# Patient Record
Sex: FEMALE | Race: WHITE | NOT HISPANIC OR LATINO | URBAN - METROPOLITAN AREA
[De-identification: names, ages, dates, MRNs, and addresses within clinical notes are randomized per-mention and may not be internally consistent; named-entity substitution may affect disease eponyms.]

---

## 2018-10-15 ENCOUNTER — TELEPHONE (OUTPATIENT)
Dept: OBSTETRICS AND GYNECOLOGY | Facility: CLINIC | Age: 43
End: 2018-10-15

## 2018-10-15 RX ORDER — FLUCONAZOLE 100 MG/1
100 TABLET ORAL DAILY
Qty: 3 TABLET | Refills: 0 | Status: SHIPPED | OUTPATIENT
Start: 2018-10-15 | End: 2019-11-27 | Stop reason: ALTCHOICE

## 2018-12-20 ENCOUNTER — TELEPHONE (OUTPATIENT)
Dept: OBSTETRICS AND GYNECOLOGY | Facility: CLINIC | Age: 43
End: 2018-12-20

## 2018-12-20 RX ORDER — FLUCONAZOLE 100 MG/1
100 TABLET ORAL DAILY
Qty: 3 TABLET | Refills: 0 | Status: SHIPPED | OUTPATIENT
Start: 2018-12-20 | End: 2019-11-27 | Stop reason: ALTCHOICE

## 2018-12-20 RX ORDER — CLOTRIMAZOLE AND BETAMETHASONE DIPROPIONATE 10; .64 MG/G; MG/G
CREAM TOPICAL 2 TIMES DAILY
Qty: 45 G | Refills: 1 | Status: SHIPPED | OUTPATIENT
Start: 2018-12-20 | End: 2019-11-27 | Stop reason: ALTCHOICE

## 2018-12-20 NOTE — TELEPHONE ENCOUNTER
Patient called and has a terrible yeast infection and needs a prescription called in asap to [facility] at [phone].  Please call.  Thank you!

## 2019-01-15 ENCOUNTER — TELEPHONE (OUTPATIENT)
Dept: OBSTETRICS AND GYNECOLOGY | Facility: CLINIC | Age: 44
End: 2019-01-15
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
## 2019-02-07 ENCOUNTER — TELEPHONE (OUTPATIENT)
Dept: OBSTETRICS AND GYNECOLOGY | Facility: CLINIC | Age: 44
End: 2019-02-07

## 2019-03-18 ENCOUNTER — TELEPHONE (OUTPATIENT)
Dept: OBSTETRICS AND GYNECOLOGY | Facility: CLINIC | Age: 44
End: 2019-03-18

## 2019-03-18 DIAGNOSIS — Z3A.01 LESS THAN 8 WEEKS GESTATION OF PREGNANCY: Primary | ICD-10-CM

## 2019-03-18 NOTE — TELEPHONE ENCOUNTER
2nd call, patient wants to know if you can put the order through for her blood work (progestrone and hcg) to [facility].  Please call.  Thank you

## 2019-03-18 NOTE — TELEPHONE ENCOUNTER
Patient called, she would like  to order lab test for Pregnancy HCG and Progesterone due to her having miscarriages in the past. She also want a prescription for Progesterone order at pharmacy. Please notify patient

## 2019-03-22 ENCOUNTER — TELEPHONE (OUTPATIENT)
Dept: OBSTETRICS AND GYNECOLOGY | Facility: CLINIC | Age: 44
End: 2019-03-22

## 2019-03-22 LAB
B-HCG SERPL QL: POSITIVE MIU/ML
HCG INTACT+B SERPL-ACNC: 589 MIU/ML
PROGEST SERPL-MCNC: 12.3 NG/ML
SPECIMEN STATUS: NORMAL

## 2019-03-23 NOTE — TELEPHONE ENCOUNTER
On 3 18 Monday  Beta 405   Repeated 3 21 Thursday 589. Poor rise will check Monday stat  Ectopic precautions given

## 2019-03-25 ENCOUNTER — APPOINTMENT (OUTPATIENT)
Dept: LAB | Facility: HOSPITAL | Age: 44
End: 2019-03-25
Attending: OBSTETRICS & GYNECOLOGY
Payer: COMMERCIAL

## 2019-03-25 DIAGNOSIS — N97.9 FEMALE FERTILITY PROBLEM: Primary | ICD-10-CM

## 2019-03-25 DIAGNOSIS — E34.9 HORMONE IMBALANCE: ICD-10-CM

## 2019-03-25 DIAGNOSIS — N97.9 FEMALE FERTILITY PROBLEM: ICD-10-CM

## 2019-03-25 LAB — HCG SERPL-ACNC: 2068 MIU/L

## 2019-03-25 PROCEDURE — 84702 CHORIONIC GONADOTROPIN TEST: CPT

## 2019-03-25 PROCEDURE — 36415 COLL VENOUS BLD VENIPUNCTURE: CPT

## 2019-03-25 NOTE — TELEPHONE ENCOUNTER
Patient called and said that she had texted Dr. Beauchamp about a set up for [facility] appt(?)  She wasn't sure what that meant.  But, she would like blood work ordered (hcg quantative test) and sent to [facility]. Please call.  Thank you

## 2019-03-26 NOTE — TELEPHONE ENCOUNTER
"hCG,Beta Subunit,Qnt,Serum mIU/mL 589  <6 mIU/mL\" class=\"[id number]\">  PositiveR         "

## 2019-04-02 ENCOUNTER — APPOINTMENT (OUTPATIENT)
Dept: LAB | Facility: HOSPITAL | Age: 44
End: 2019-04-02
Attending: OBSTETRICS & GYNECOLOGY
Payer: COMMERCIAL

## 2019-04-02 ENCOUNTER — TELEPHONE (OUTPATIENT)
Dept: OBSTETRICS AND GYNECOLOGY | Facility: CLINIC | Age: 44
End: 2019-04-02

## 2019-04-02 ENCOUNTER — OFFICE VISIT (OUTPATIENT)
Dept: OBSTETRICS AND GYNECOLOGY | Facility: CLINIC | Age: 44
End: 2019-04-02
Payer: COMMERCIAL

## 2019-04-02 VITALS
DIASTOLIC BLOOD PRESSURE: 62 MMHG | SYSTOLIC BLOOD PRESSURE: 88 MMHG | HEIGHT: 70 IN | BODY MASS INDEX: 22.48 KG/M2 | WEIGHT: 157 LBS

## 2019-04-02 DIAGNOSIS — N91.2 AMENORRHEA: Primary | ICD-10-CM

## 2019-04-02 DIAGNOSIS — Z3A.01 LESS THAN 8 WEEKS GESTATION OF PREGNANCY: ICD-10-CM

## 2019-04-02 LAB — HCG SERPL-ACNC: ABNORMAL MIU/L

## 2019-04-02 PROCEDURE — 76830 TRANSVAGINAL US NON-OB: CPT | Performed by: OBSTETRICS & GYNECOLOGY

## 2019-04-02 PROCEDURE — 36415 COLL VENOUS BLD VENIPUNCTURE: CPT

## 2019-04-02 PROCEDURE — 84702 CHORIONIC GONADOTROPIN TEST: CPT

## 2019-04-02 PROCEDURE — 99214 OFFICE O/P EST MOD 30 MIN: CPT | Mod: 25 | Performed by: OBSTETRICS & GYNECOLOGY

## 2019-04-02 RX ORDER — PROGESTERONE 200 MG/1
CAPSULE ORAL
Refills: 0 | COMMUNITY
Start: 2019-03-25 | End: 2019-07-09 | Stop reason: HOSPADM

## 2019-04-02 NOTE — PROGRESS NOTES
"  Subjective     Patient ID: Frances Oconnor is a 44 y.o. female.    HPI               OB visit     Review of Systems    Objective     Vitals:    04/02/19 1531   BP: (!) 88/62   Weight: 71.2 kg (157 lb)   Height: 1.778 m (5' 10\")     Body mass index is 22.53 kg/m².    Physical Exam     Early pregnancy poor dates still breast feeding no menses   On 3 18 Monday  Beta 405   Repeated 3 21 Thursday 589. Poor rise will check Monday stat  Ectopic precautions given   3/25 beta was 2,068     Ultrasound     yok sac 4 cm, small gestational sac 1.3 cm , no CRL yet no fetal pole, by first conception by first positive pregnancy would expect 6 today 5/6       Assessment/Plan      Do [facility]       Diagnoses and all orders for this visit:    Amenorrhea (Primary)    Less than 8 weeks gestation of pregnancy  -     BhCG, Serum, Quant; Future        Jeannette CAIN MA, am scribing for, and in the presence of, Pascual Beauchamp MD.    4/2/2019 4:11 PM     IPascual MD, personally performed the services described in this documentation as scribed by alexandr  in my presence, and it is both accurate and complete.    4/2/2019 4:11 PM  "

## 2019-04-02 NOTE — TELEPHONE ENCOUNTER
Frances had a stat beta today 13,662 this is up from 2000 on 325 although is going up not an appropriate rise still with some concern regarding the pregnancy to rescan next week

## 2019-04-11 ENCOUNTER — OFFICE VISIT (OUTPATIENT)
Dept: OBSTETRICS AND GYNECOLOGY | Facility: CLINIC | Age: 44
End: 2019-04-11
Payer: COMMERCIAL

## 2019-04-11 VITALS
DIASTOLIC BLOOD PRESSURE: 68 MMHG | SYSTOLIC BLOOD PRESSURE: 98 MMHG | BODY MASS INDEX: 23.05 KG/M2 | WEIGHT: 161 LBS | HEIGHT: 70 IN

## 2019-04-11 DIAGNOSIS — Z34.01 ENCOUNTER FOR SUPERVISION OF NORMAL FIRST PREGNANCY IN FIRST TRIMESTER: Primary | ICD-10-CM

## 2019-04-11 DIAGNOSIS — Z3A.01 LESS THAN 8 WEEKS GESTATION OF PREGNANCY: ICD-10-CM

## 2019-04-11 DIAGNOSIS — R93.89 ABNORMAL ULTRASOUND: ICD-10-CM

## 2019-04-11 DIAGNOSIS — Q99.2 FRAGILE X CHROMOSOMAL ANOMALY: ICD-10-CM

## 2019-04-11 LAB
BLOOD URINE, POC: NEGATIVE
GLUCOSE URINE, POC: NEGATIVE
NITRITE, POC: NEGATIVE
PROTEIN, POC: NEGATIVE

## 2019-04-11 PROCEDURE — 81002 URINALYSIS NONAUTO W/O SCOPE: CPT | Performed by: OBSTETRICS & GYNECOLOGY

## 2019-04-11 PROCEDURE — 0502F SUBSEQUENT PRENATAL CARE: CPT | Performed by: OBSTETRICS & GYNECOLOGY

## 2019-04-11 PROCEDURE — 76815 OB US LIMITED FETUS(S): CPT | Performed by: OBSTETRICS & GYNECOLOGY

## 2019-04-11 NOTE — PROGRESS NOTES
F/u fr early pregnancy, had exam a week ago had gestational sac no fetal pole concern about dating an low rising BETA, on Prometrium   Genetics border fragile X    Ultrasound Dr Beauchamp  Abdominal indication 1st trimester      CRL 1cm, 7 wks 1 days , , normal gestational sac,         Assessment Plan     Check in 2 weeks prenatal labs

## 2019-04-17 LAB
ERYTHROCYTE [DISTWIDTH] IN BLOOD BY AUTOMATED COUNT: 13.5 % (ref 12.3–15.4)
HCT VFR BLD AUTO: 39.6 % (ref 34–46.6)
HGB BLD-MCNC: 13 G/DL (ref 11.1–15.9)
MCH RBC QN AUTO: 28.8 PG (ref 26.6–33)
MCHC RBC AUTO-ENTMCNC: 32.8 G/DL (ref 31.5–35.7)
MCV RBC AUTO: 88 FL (ref 79–97)
PLATELET # BLD AUTO: 176 X10E3/UL (ref 150–379)
RBC # BLD AUTO: 4.52 X10E6/UL (ref 3.77–5.28)
WBC # BLD AUTO: 6.5 X10E3/UL (ref 3.4–10.8)

## 2019-04-18 LAB
25(OH)D3+25(OH)D2 SERPL-MCNC: 27.9 NG/ML (ref 30–100)
ABO GROUP BLD: NORMAL
APPEARANCE UR: CLEAR
BACTERIA #/AREA URNS HPF: NORMAL /[HPF]
BACTERIA UR CULT: NO GROWTH
BACTERIA UR CULT: NORMAL
BILIRUB UR QL STRIP: NEGATIVE
BLD GP AB SCN SERPL QL: NEGATIVE
COLOR UR: YELLOW
EPI CELLS #/AREA URNS HPF: NORMAL /HPF
GLUCOSE UR QL: NEGATIVE
HCG INTACT+B SERPL-ACNC: NORMAL MIU/ML
HCV AB S/CO SERPL IA: <0.1 S/CO RATIO (ref 0–0.9)
HGB UR QL STRIP: NEGATIVE
HIV 1+2 AB+HIV1 P24 AG SERPL QL IA: NON REACTIVE
KETONES UR QL STRIP: NEGATIVE
LAB CORP COMMENT:: NORMAL
LEUKOCYTE ESTERASE UR QL STRIP: NEGATIVE
MICRO URNS: (no result)
MICRO URNS: (no result)
MUCOUS THREADS URNS QL MICRO: PRESENT
NITRITE UR QL STRIP: NEGATIVE
PH UR STRIP: 6.5 [PH] (ref 5–7.5)
PROGEST SERPL-MCNC: 17 NG/ML
PROT UR QL STRIP: NEGATIVE
RBC #/AREA URNS HPF: NORMAL /HPF
RH BLD: NEGATIVE
RPR SER QL: NON REACTIVE
RUBV IGG SERPL IA-ACNC: 1.63 INDEX
SP GR UR: 1.02 (ref 1–1.03)
T GONDII IGM SER IA-ACNC: <3 AU/ML (ref 0–7.9)
T4 FREE SERPL-MCNC: 0.66 NG/DL (ref 0.82–1.77)
TSH SERPL DL<=0.005 MIU/L-ACNC: 1.48 UIU/ML (ref 0.45–4.5)
UROBILINOGEN UR STRIP-MCNC: 0.2 EU/DL (ref 0.2–1)
VZV IGG SER IA-ACNC: <135 INDEX
WBC #/AREA URNS HPF: NORMAL /HPF

## 2019-04-20 LAB
B19V IGG SER IA-ACNC: 0.2 INDEX (ref 0–0.8)
B19V IGM SER IA-ACNC: 0.1 INDEX (ref 0–0.8)

## 2019-04-23 ENCOUNTER — OFFICE VISIT (OUTPATIENT)
Dept: OBSTETRICS AND GYNECOLOGY | Facility: CLINIC | Age: 44
End: 2019-04-23
Payer: COMMERCIAL

## 2019-04-23 VITALS
SYSTOLIC BLOOD PRESSURE: 108 MMHG | HEIGHT: 70 IN | WEIGHT: 163 LBS | DIASTOLIC BLOOD PRESSURE: 60 MMHG | BODY MASS INDEX: 23.34 KG/M2

## 2019-04-23 DIAGNOSIS — Z3A.16 16 WEEKS GESTATION OF PREGNANCY: ICD-10-CM

## 2019-04-23 DIAGNOSIS — Z34.01 ENCOUNTER FOR SUPERVISION OF NORMAL FIRST PREGNANCY IN FIRST TRIMESTER: Primary | ICD-10-CM

## 2019-04-23 DIAGNOSIS — Z3A.10 10 WEEKS GESTATION OF PREGNANCY: ICD-10-CM

## 2019-04-23 PROCEDURE — 81002 URINALYSIS NONAUTO W/O SCOPE: CPT | Performed by: OBSTETRICS & GYNECOLOGY

## 2019-04-23 PROCEDURE — 0502F SUBSEQUENT PRENATAL CARE: CPT | Performed by: OBSTETRICS & GYNECOLOGY

## 2019-04-23 PROCEDURE — 76815 OB US LIMITED FETUS(S): CPT | Performed by: OBSTETRICS & GYNECOLOGY

## 2019-04-23 NOTE — PRENATAL NOTE
"  Ul              F/u fr early pregnancy, had exam a week ago had gestational sac no fetal pole concern about dating an low rising BETA, on Prometrium   Genetics border fragile X     Ultrasound Dr Beauchamp  Abdominal indication 1st trimester       CRL 1cm, 7 wks 1 days , , normal gestational sac,             Patient ID: Frances Oconnor is a 44 y.o. female.     HPI               OB visit      Review of Systems        Objective         Vitals       Vitals:     04/02/19 1531   BP: (!) 88/62   Weight: 71.2 kg (157 lb)   Height: 1.778 m (5' 10\")         Body mass index is 22.53 kg/m².     Physical Exam      Early pregnancy poor dates still breast feeding no menses   On 3 18 Monday  Beta 405   Repeated 3 21 Thursday 589. Poor rise will check Monday stat  Ectopic precautions given   3/25 beta was 2,068      Ultrasound      yok sac 4 cm, small gestational sac 1.3 cm , no CRL yet no fetal pole, by first conception by first positive pregnancy would expect 6 today 5/6            Assessment/Plan          Do [facility]         Diagnoses and all orders for this visit:     Amenorrhea (Primary)     Less than 8 weeks gestation of pregnancy  -     BhCG, Serum, Quant; Future       "

## 2019-04-26 LAB
FMR1 GENE CGG RPT BLD/T QL: NORMAL
FMR1 GENE MUT ANL BLD/T: NORMAL
LAB CORP COMMENT:: NORMAL

## 2019-05-09 LAB
# FETUSES US: 1
CHR X + Y ANEUP PLAS.CFDNA: NORMAL
CYTOGENETICS STUDY: NORMAL
GA: 10.6 WEEKS
GENETIC ALGORITHM SENSITIVITY: NORMAL %
LAB CORP CHROMOSOME 13 INTERPRETATION: NORMAL
LAB CORP CHROMOSOME 13 RESULT: NORMAL
LAB CORP CHROMOSOME 18 INTERPRETATION: NORMAL
LAB CORP CHROMOSOME 18 RESULT: NORMAL
LAB CORP CHROMOSOME 21 INTERPRETATION: NORMAL
LAB CORP CHROMOSOME 21 RESULT: NORMAL
LAB CORP DISCLAIMER: NORMAL
LAB CORP FETAL FRACTION (%):: 7.4
LAB CORP INDICATION FOR TESTING: NORMAL
LAB CORP REFERENCES: NORMAL
LAB CORP SCREEN RESULT: NORMAL
LAB CORP SEX CHROMOSOME INTERPRETATION: NORMAL
LAB DIRECTOR NAME PROVIDER: NORMAL
REF LAB TEST METHOD: NORMAL
SERVICE CMNT 02-IMP: NORMAL
SERVICE CMNT-IMP: NORMAL

## 2019-05-15 ENCOUNTER — OFFICE VISIT (OUTPATIENT)
Dept: OBSTETRICS AND GYNECOLOGY | Facility: CLINIC | Age: 44
End: 2019-05-15
Payer: COMMERCIAL

## 2019-05-15 ENCOUNTER — TELEPHONE (OUTPATIENT)
Dept: OBSTETRICS AND GYNECOLOGY | Facility: CLINIC | Age: 44
End: 2019-05-15

## 2019-05-15 VITALS
BODY MASS INDEX: 24.62 KG/M2 | WEIGHT: 172 LBS | DIASTOLIC BLOOD PRESSURE: 70 MMHG | HEIGHT: 70 IN | SYSTOLIC BLOOD PRESSURE: 110 MMHG

## 2019-05-15 DIAGNOSIS — Z34.91 PRENATAL CARE IN FIRST TRIMESTER: Primary | ICD-10-CM

## 2019-05-15 LAB
BLOOD URINE, POC: NEGATIVE
GLUCOSE URINE, POC: NEGATIVE
KETONES, POC: NEGATIVE
LEUKOCYTE EST, POC: NEGATIVE
NITRITE, POC: NEGATIVE
PROTEIN, POC: NEGATIVE

## 2019-05-15 PROCEDURE — 81002 URINALYSIS NONAUTO W/O SCOPE: CPT | Performed by: OBSTETRICS & GYNECOLOGY

## 2019-05-15 PROCEDURE — 0502F SUBSEQUENT PRENATAL CARE: CPT | Performed by: OBSTETRICS & GYNECOLOGY

## 2019-05-15 ASSESSMENT — PAIN SCALES - GENERAL: PAINLEVEL: 0-NO PAIN

## 2019-05-15 NOTE — TELEPHONE ENCOUNTER
Patient called and said that she forgot that she will be away almost the entire month of June and Dr. Beauchamp had told her to get 16 weeks non-stress test.  She wasn't sure if she could have or get that done while away.  Please call.  Thank you

## 2019-05-15 NOTE — TELEPHONE ENCOUNTER
F[place] I believe Frances is referring to her alpha-fetoprotein blood sample this can be done anywhere between 16 and 22 weeks she can certainly do it when she gets back or she can go to a lab wherever she is going to be in June to get it done whatever works for her

## 2019-05-15 NOTE — PRENATAL NOTE
"\"ITS A ORION(GIRL)!\"    Pt denies any problems or concerns    Urine dipstick shows negative for all components.    "

## 2019-05-21 ENCOUNTER — TELEPHONE (OUTPATIENT)
Dept: OBSTETRICS AND GYNECOLOGY | Facility: CLINIC | Age: 44
End: 2019-05-21

## 2019-05-21 NOTE — TELEPHONE ENCOUNTER
Patient called and said that she is going to be in [place] and will get her blood drawn while there for MSAP test.  She states that she will go to [facility] in [place].  Please send the blood work order.  Thank you

## 2019-06-03 ENCOUNTER — OFFICE VISIT (OUTPATIENT)
Dept: OBSTETRICS AND GYNECOLOGY | Facility: CLINIC | Age: 44
End: 2019-06-03
Payer: COMMERCIAL

## 2019-06-03 VITALS — BODY MASS INDEX: 25.33 KG/M2 | WEIGHT: 174 LBS | SYSTOLIC BLOOD PRESSURE: 104 MMHG | DIASTOLIC BLOOD PRESSURE: 60 MMHG

## 2019-06-03 DIAGNOSIS — Z3A.15 15 WEEKS GESTATION OF PREGNANCY: ICD-10-CM

## 2019-06-03 DIAGNOSIS — Z34.82 PRENATAL CARE, SUBSEQUENT PREGNANCY, SECOND TRIMESTER: Primary | ICD-10-CM

## 2019-06-03 DIAGNOSIS — Z36.3 ENCOUNTER FOR ANTENATAL SCREENING FOR MALFORMATION: ICD-10-CM

## 2019-06-03 PROCEDURE — 0502F SUBSEQUENT PRENATAL CARE: CPT | Performed by: OBSTETRICS & GYNECOLOGY

## 2019-06-09 ENCOUNTER — TELEPHONE (OUTPATIENT)
Dept: OBSTETRICS AND GYNECOLOGY | Facility: CLINIC | Age: 44
End: 2019-06-09

## 2019-06-09 RX ORDER — AZITHROMYCIN 250 MG/1
TABLET, FILM COATED ORAL
Qty: 6 TABLET | Refills: 0 | Status: SHIPPED | OUTPATIENT
Start: 2019-06-09 | End: 2019-07-09 | Stop reason: HOSPADM

## 2019-06-09 NOTE — TELEPHONE ENCOUNTER
Pt called answering service back with pharmacy number. Rx sent for azithromycin. Attempted to reach pt to let her know Rx was sent, phone disconnected.

## 2019-06-09 NOTE — TELEPHONE ENCOUNTER
Pt called answering service. I spoke with pt. Pt is 15wks pregnant. Has had a cold for the past few days, now feels that she has a bacterial infection. Tmax 99. No fevers. States that she feels slightly SOB with chest congestion. In [facility] in [place], nearest urgent care is 1hr away. Wants a Rx for Abx. As I was trying to get pharmacy information, phone got disconnected. I attempted to reach her 4 additional times, unable to reach pt.

## 2019-06-10 ENCOUNTER — TELEPHONE (OUTPATIENT)
Dept: OBSTETRICS AND GYNECOLOGY | Facility: CLINIC | Age: 44
End: 2019-06-10

## 2019-06-25 ENCOUNTER — OFFICE VISIT (OUTPATIENT)
Dept: OBSTETRICS AND GYNECOLOGY | Facility: CLINIC | Age: 44
End: 2019-06-25
Payer: COMMERCIAL

## 2019-06-25 VITALS
HEIGHT: 70 IN | RESPIRATION RATE: 16 BRPM | BODY MASS INDEX: 24.91 KG/M2 | DIASTOLIC BLOOD PRESSURE: 64 MMHG | SYSTOLIC BLOOD PRESSURE: 106 MMHG | WEIGHT: 174 LBS

## 2019-06-25 DIAGNOSIS — Z34.02 ENCOUNTER FOR SUPERVISION OF NORMAL FIRST PREGNANCY IN SECOND TRIMESTER: Primary | ICD-10-CM

## 2019-06-25 DIAGNOSIS — Z36.3 ANTENATAL SCREENING FOR MALFORMATION USING ULTRASONICS: ICD-10-CM

## 2019-06-25 DIAGNOSIS — Z34.92 PRENATAL CARE IN SECOND TRIMESTER: ICD-10-CM

## 2019-06-25 DIAGNOSIS — Z13.79 ENCOUNTER FOR GENETIC SCREENING FOR DOWN SYNDROME: ICD-10-CM

## 2019-06-25 LAB
BLOOD URINE, POC: NEGATIVE
GLUCOSE URINE, POC: NEGATIVE
KETONES, POC: NEGATIVE
LEUKOCYTE EST, POC: NEGATIVE
NITRITE, POC: NEGATIVE
PROTEIN, POC: NORMAL

## 2019-06-25 PROCEDURE — 59425 ANTEPARTUM CARE ONLY: CPT | Performed by: OBSTETRICS & GYNECOLOGY

## 2019-06-25 PROCEDURE — 0502F SUBSEQUENT PRENATAL CARE: CPT | Performed by: OBSTETRICS & GYNECOLOGY

## 2019-06-25 PROCEDURE — 81002 URINALYSIS NONAUTO W/O SCOPE: CPT | Performed by: OBSTETRICS & GYNECOLOGY

## 2019-06-25 ASSESSMENT — PAIN SCALES - GENERAL: PAINLEVEL: 0-NO PAIN

## 2019-06-27 ENCOUNTER — TELEPHONE (OUTPATIENT)
Dept: OBSTETRICS AND GYNECOLOGY | Facility: CLINIC | Age: 44
End: 2019-06-27

## 2019-06-27 DIAGNOSIS — Z13.29 SCREENING FOR THYROID DISORDER: Primary | ICD-10-CM

## 2019-07-09 ENCOUNTER — HOSPITAL ENCOUNTER (OUTPATIENT)
Facility: HOSPITAL | Age: 44
Discharge: HOME | End: 2019-07-09
Attending: OBSTETRICS & GYNECOLOGY | Admitting: OBSTETRICS & GYNECOLOGY
Payer: COMMERCIAL

## 2019-07-09 VITALS
WEIGHT: 182 LBS | TEMPERATURE: 99.2 F | BODY MASS INDEX: 26.11 KG/M2 | DIASTOLIC BLOOD PRESSURE: 62 MMHG | SYSTOLIC BLOOD PRESSURE: 103 MMHG | HEART RATE: 83 BPM
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
## 2019-07-09 LAB
ABO + RH BLD: NORMAL
AFP ADJ MOM SERPL: 1.13
AFP INTERP SERPL-IMP: NORMAL
AFP INTERP SERPL-IMP: NORMAL
AFP SERPL-MCNC: 47 NG/ML
AGE AT DELIVERY: 42.7 YR
BLD GP AB SCN SERPL QL: NEGATIVE
D AG BLD QL: NEGATIVE
GA METHOD: NORMAL
GA: 18.6 WEEKS
HGB F MFR BLD KLEIH BETKE: <0.1 %
IDDM PATIENT QL: NO
LAB CORP COMMENT:: NORMAL
LAB CORP RESULTS: NORMAL
LABORATORY COMMENT REPORT: NORMAL
MULTIPLE PREGNANCY: NO
NEURAL TUBE DEFECT RISK FETUS: 8106 %

## 2019-07-09 PROCEDURE — 85460 HEMOGLOBIN FETAL: CPT | Performed by: NURSE PRACTITIONER

## 2019-07-09 PROCEDURE — 36415 COLL VENOUS BLD VENIPUNCTURE: CPT | Performed by: NURSE PRACTITIONER

## 2019-07-09 PROCEDURE — 86900 BLOOD TYPING SEROLOGIC ABO: CPT
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
## 2019-07-10 NOTE — TELEPHONE ENCOUNTER
----- Message from Pascual Beauchamp MD sent at 7/10/2019  9:36 AM EDT -----  Ade can you call this patient tell her that her alpha-fetoprotein blood sample was normal

## 2019-07-16 ENCOUNTER — HOSPITAL ENCOUNTER (OUTPATIENT)
Dept: PERINATAL CARE | Facility: HOSPITAL | Age: 44
Discharge: HOME | End: 2019-07-16
Attending: OBSTETRICS & GYNECOLOGY
Payer: COMMERCIAL

## 2019-07-16 ENCOUNTER — OFFICE VISIT (OUTPATIENT)
Dept: OBSTETRICS AND GYNECOLOGY | Facility: CLINIC | Age: 44
End: 2019-07-16
Payer: COMMERCIAL

## 2019-07-16 VITALS
SYSTOLIC BLOOD PRESSURE: 110 MMHG | DIASTOLIC BLOOD PRESSURE: 72 MMHG | HEIGHT: 70 IN | WEIGHT: 182 LBS | RESPIRATION RATE: 16 BRPM | BODY MASS INDEX: 26.05 KG/M2

## 2019-07-16 DIAGNOSIS — O09.522 AMA (ADVANCED MATERNAL AGE) MULTIGRAVIDA 35+, SECOND TRIMESTER: ICD-10-CM

## 2019-07-16 DIAGNOSIS — Z36.3 ENCOUNTER FOR ANTENATAL SCREENING FOR MALFORMATION: ICD-10-CM

## 2019-07-16 DIAGNOSIS — Z3A.21 21 WEEKS GESTATION OF PREGNANCY: ICD-10-CM

## 2019-07-16 DIAGNOSIS — O35.9XX0 SUSPECTED FETAL ABNORMALITY AFFECTING MANAGEMENT OF MOTHER, SINGLE OR UNSPECIFIED FETUS: ICD-10-CM

## 2019-07-16 DIAGNOSIS — Z36.3 ANTENATAL SCREENING FOR MALFORMATION USING ULTRASONICS: ICD-10-CM

## 2019-07-16 DIAGNOSIS — Z34.02 ENCOUNTER FOR SUPERVISION OF NORMAL FIRST PREGNANCY IN SECOND TRIMESTER: Primary | ICD-10-CM

## 2019-07-16 PROCEDURE — 76811 OB US DETAILED SNGL FETUS: CPT

## 2019-07-16 PROCEDURE — 0502F SUBSEQUENT PRENATAL CARE: CPT | Performed by: OBSTETRICS & GYNECOLOGY

## 2019-07-16 ASSESSMENT — PAIN SCALES - GENERAL: PAINLEVEL: 0-NO PAIN
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
## 2019-07-19 PROBLEM — Z34.02 ENCOUNTER FOR SUPERVISION OF NORMAL FIRST PREGNANCY IN SECOND TRIMESTER: Status: ACTIVE | Noted: 2019-07-19

## 2019-08-06 ENCOUNTER — HOSPITAL ENCOUNTER (OUTPATIENT)
Facility: HOSPITAL | Age: 44
Discharge: HOME | End: 2019-08-06
Attending: OBSTETRICS & GYNECOLOGY | Admitting: OBSTETRICS & GYNECOLOGY
Payer: COMMERCIAL

## 2019-08-06 VITALS — TEMPERATURE: 98.9 F | HEIGHT: 70 IN | WEIGHT: 187.38 LBS | BODY MASS INDEX: 26.82 KG/M2
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
## 2019-08-06 PROCEDURE — 59025 FETAL NON-STRESS TEST: CPT

## 2019-08-06 PROCEDURE — 99213 OFFICE O/P EST LOW 20 MIN: CPT | Performed by: OBSTETRICS & GYNECOLOGY
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
## 2019-08-13 ENCOUNTER — OFFICE VISIT (OUTPATIENT)
Dept: OBSTETRICS AND GYNECOLOGY | Facility: CLINIC | Age: 44
End: 2019-08-13
Payer: COMMERCIAL

## 2019-08-13 VITALS
WEIGHT: 187 LBS | BODY MASS INDEX: 26.77 KG/M2 | HEIGHT: 70 IN | DIASTOLIC BLOOD PRESSURE: 70 MMHG | SYSTOLIC BLOOD PRESSURE: 104 MMHG

## 2019-08-13 DIAGNOSIS — Z34.02 ENCOUNTER FOR SUPERVISION OF NORMAL FIRST PREGNANCY IN SECOND TRIMESTER: Primary | ICD-10-CM

## 2019-08-13 DIAGNOSIS — Z3A.25 25 WEEKS GESTATION OF PREGNANCY: ICD-10-CM

## 2019-08-13 PROCEDURE — 36415 COLL VENOUS BLD VENIPUNCTURE: CPT | Performed by: OBSTETRICS & GYNECOLOGY

## 2019-08-13 PROCEDURE — 0502F SUBSEQUENT PRENATAL CARE: CPT | Performed by: OBSTETRICS & GYNECOLOGY

## 2019-08-13 ASSESSMENT — PAIN SCALES - GENERAL: PAINLEVEL: 0-NO PAIN
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
## 2019-08-14 ENCOUNTER — TELEPHONE (OUTPATIENT)
Dept: OBSTETRICS AND GYNECOLOGY | Facility: CLINIC | Age: 44
End: 2019-08-14

## 2019-08-14 LAB
BLD GP AB SCN SERPL QL: POSITIVE
BLD GP AB SCN TITR SERPL: ABNORMAL {TITER}
BLOOD GROUP ANTIBODIES SERPL: ABNORMAL
ERYTHROCYTE [DISTWIDTH] IN BLOOD BY AUTOMATED COUNT: 12.8 % (ref 11–15)
GLUCOSE 1H P 50 G GLC PO SERPL-MCNC: 80 MG/DL
HCT VFR BLD AUTO: 32.8 % (ref 35–45)
HGB BLD-MCNC: 10.8 G/DL (ref 11.7–15.5)
MCH RBC QN AUTO: 29.3 PG (ref 27–33)
MCHC RBC AUTO-ENTMCNC: 32.9 G/DL (ref 32–36)
MCV RBC AUTO: 89.1 FL (ref 80–100)
PLATELET # BLD AUTO: 145 THOUSAND/UL (ref 140–400)
PMV BLD REES-ECKER: 12.8 FL (ref 7.5–12.5)
QUEST (ALWAYS MESSAGE): ABNORMAL
RBC # BLD AUTO: 3.68 MILLION/UL (ref 3.8–5.1)
T4 FREE SERPL-MCNC: 0.8 NG/DL (ref 0.8–1.8)
TSH SERPL-ACNC: 1.13 MIU/L
WBC # BLD AUTO: 8 THOUSAND/UL (ref 3.8–10.8)

## 2019-08-15 ENCOUNTER — TELEPHONE (OUTPATIENT)
Dept: OBSTETRICS AND GYNECOLOGY | Facility: CLINIC | Age: 44
End: 2019-08-15

## 2019-08-15 PROBLEM — O34.219 UTERINE SCAR FROM PREVIOUS CESAREAN DELIVERY, WITH DELIVERY: Status: ACTIVE | Noted: 2019-08-15

## 2019-08-26 ENCOUNTER — TELEPHONE (OUTPATIENT)
Dept: OBSTETRICS AND GYNECOLOGY | Facility: CLINIC | Age: 44
End: 2019-08-26

## 2019-09-05 ENCOUNTER — OFFICE VISIT (OUTPATIENT)
Dept: OBSTETRICS AND GYNECOLOGY | Facility: CLINIC | Age: 44
End: 2019-09-05
Payer: COMMERCIAL

## 2019-09-05 VITALS
DIASTOLIC BLOOD PRESSURE: 72 MMHG | HEIGHT: 70 IN | WEIGHT: 190 LBS | SYSTOLIC BLOOD PRESSURE: 120 MMHG | BODY MASS INDEX: 27.2 KG/M2

## 2019-09-05 DIAGNOSIS — O09.523 ELDERLY MULTIGRAVIDA IN THIRD TRIMESTER: Primary | ICD-10-CM

## 2019-09-05 DIAGNOSIS — Z3A.28 28 WEEKS GESTATION OF PREGNANCY: ICD-10-CM

## 2019-09-05 DIAGNOSIS — Z34.03 ENCOUNTER FOR SUPERVISION OF NORMAL FIRST PREGNANCY IN THIRD TRIMESTER: Primary | ICD-10-CM

## 2019-09-05 PROCEDURE — 81002 URINALYSIS NONAUTO W/O SCOPE: CPT | Performed by: OBSTETRICS & GYNECOLOGY

## 2019-09-05 PROCEDURE — 0502F SUBSEQUENT PRENATAL CARE: CPT | Performed by: OBSTETRICS & GYNECOLOGY

## 2019-09-05 NOTE — TELEPHONE ENCOUNTER
Pt is 32 weeks and said you requested she needs a ultra sound, 9 stress test and Sigifredo  Now.  And again at 36 weeks.  Then In between 32-26 a stress test every week.  No scripts in the system for none

## 2019-09-05 NOTE — PRENATAL NOTE
JACKLYN [facility]    TEMPLE TO FIT  [facility]   HASHIMOTOS ON ARMOUR 90 MG BID   SECTION NOV 20 TH 2 PRIOR

## 2019-09-12 ENCOUNTER — TELEPHONE (OUTPATIENT)
Dept: OBSTETRICS AND GYNECOLOGY | Facility: CLINIC | Age: 44
End: 2019-09-12

## 2019-09-12 NOTE — TELEPHONE ENCOUNTER
Pt called stating that she tried to set up her Non Stress test, but she was unable to due so because she was  told that the orders were not in the system. I spoke with pt and stated to her that she has 3 PTC Non stress test orders in the system for her, and two U/s orders. After stating that information pt stated that she would have to call the back she is having a problem with her phone and she was unable to hear me. Pt hung up the phone right after stating the above information. Mtt

## 2019-09-27 ENCOUNTER — HOSPITAL ENCOUNTER (OUTPATIENT)
Facility: HOSPITAL | Age: 44
Setting detail: SURGERY ADMIT
Discharge: HOME | End: 2019-09-27
Attending: OBSTETRICS & GYNECOLOGY | Admitting: OBSTETRICS & GYNECOLOGY
Payer: COMMERCIAL

## 2019-09-27 VITALS
WEIGHT: 198.8 LBS | RESPIRATION RATE: 20 BRPM | DIASTOLIC BLOOD PRESSURE: 56 MMHG | HEIGHT: 70 IN | SYSTOLIC BLOOD PRESSURE: 120 MMHG | HEART RATE: 93 BPM | TEMPERATURE: 98 F | BODY MASS INDEX: 28.46 KG/M2

## 2019-09-27 NOTE — H&P
HPI     Frances Oconnor is a 44 y.o. female  at 31w4d with an estimated due date of 2019, by 1st  US who presents with pelvic pressure. She started feeling pelvic pressure 2 days ago, and is concerned because she is starting to have to walk slower and have some more difficulty with her usual activity level. She feels the pressure only with walking, and feels 100% well when laying down. Denies ctx/cramping, denies VB or LOF. Reports fetal movement. She also noticed a light rash on her abdomen. The rash is not itchy, not raised.     OB History:   Obstetric History       T2      L2     SAB3   TAB0   Ectopic0   Multiple0   Live Births2       # Outcome Date GA Lbr Randolph/2nd Weight Sex Delivery Anes PTL Lv   6 Current            5 Term    3.629 kg (8 lb) M CS-LTranv  N NENA   4 2016           3 2015           2 Term    4.536 kg (10 lb) F CS-LTranv   NENA      Complications: Failure to Progress in First Stage   1 2013                  Medical History:   Past Medical History:   Diagnosis Date   • Hypothyroidism     armour-thyroid 90mg BID   • Miscarriage     x3       Surgical History:   Past Surgical History:   Procedure Laterality Date   •  SECTION      x2   • DILATION AND EVACUATION      2017 2 weeks postpartum: lochia block   • WISDOM TOOTH EXTRACTION         Social History:   Social History     Social History   • Marital status:      Spouse name: N/A   • Number of children: N/A   • Years of education: N/A     Occupational History   • homemaker      Social History Main Topics   • Smoking status: Never Smoker   • Smokeless tobacco: Never Used   • Alcohol use No   • Drug use: No   • Sexual activity: Yes     Partners: Male     Birth control/ protection: None     Other Topics Concern   • None     Social History Narrative   • None        Family History:   Family History   Problem Relation Age of Onset   • Hypertension Biological Father    • Cancer Biological  Father        Allergies: Latex and Levofloxacin    Prior to Admission medications    Medication Sig Start Date End Date Taking? Authorizing Provider   prenatal 25-iron fum-folic-dha (PRENATAL-1) 30975-200 mg-mcg-mg capsule Prenatal   Yes Provider, MD Tuan   THYROID,PORK (ARMOUR THYROID ORAL) Take 90 mg by mouth daily.     Yes Provider, MD Tuan   clotrimazole-betamethasone (LOTRISONE) 1-0.05 % cream Apply topically 2 (two) times a day.  Patient not taking: Reported on 4/2/2019 12/20/18 1/19/19  Pascual Beauchamp MD   fluconazole (DIFLUCAN) 100 mg tablet Take 1 tablet (100 mg total) by mouth daily for 3 days.  Patient not taking: Reported on 4/2/2019  10/15/18 10/18/18  Pascual Beauchamp MD   fluconazole (DIFLUCAN) 100 mg tablet Take 1 tablet (100 mg total) by mouth daily for 3 days.  Patient not taking: Reported on 4/2/2019 12/20/18 12/23/18  Pascual Beauchamp MD       Review of Systems  Pertinent items are noted in HPI.    Objective     Vital Signs for the last 24 hours:  Temp:  [36.7 °C (98 °F)] 36.7 °C (98 °F)  Heart Rate:  [93] 93  Resp:  [20] 20  BP: (120)/(56) 120/56    Latest cervical exam:  Cervical Dilation (cm): 0  Cervical Effacement: 0     Method: sterile exam per physician (Dr Avilez) (09/27/19 2168)    Additional Tests:   Sterile Speculum Exam: no      Fetal Monitoring:  FHR Baseline: 135  FHR Variability: mod  FHR Accelerations: present  FHR Decelerations: absent    Contraction Frequency: quiet    Exam:  General Appearance: Alert, cooperative, no acute distress  Lungs: Clear to auscultation bilaterally, respirations unlabored  Heart: Regular rate and rhythm, S1 and S2 normal, no murmur, rub or gallop  Abdomen: gravid, nontender, non raised macules which are very faintly erythematous  Genitalia: See vaginal exam  Extremities: no edema or calf tenderness  Neurologic: grossly intact without focal deficits    Ultrasounds:   I have reviewed the applicable  Ultrasounds.    Labs:  A-    Assessment/Plan     Frances Oconnor is a 44 y.o. female  at 31w4d admitted for observation 2/2 pelvic pressure    FHR: Reactive and AGA  GBS: unknown   Pelvic pressure: TOCO quiet, cervix closed, FFN collected but not sent. Discussed normal discomforts of pregnancy and provided reassurance and return precautions. Patient stable for d/c home.   Rash: Likely prurigo of pregnancy. Recommended hydrocortisone cream.     D/w Dr. Justin Avilez MD

## 2019-10-02 ENCOUNTER — HOSPITAL ENCOUNTER (OUTPATIENT)
Dept: PERINATAL CARE | Facility: HOSPITAL | Age: 44
Discharge: HOME | End: 2019-10-02
Attending: OBSTETRICS & GYNECOLOGY
Payer: COMMERCIAL

## 2019-10-02 ENCOUNTER — OFFICE VISIT (OUTPATIENT)
Dept: OBSTETRICS AND GYNECOLOGY | Facility: CLINIC | Age: 44
End: 2019-10-02
Payer: COMMERCIAL

## 2019-10-02 VITALS
HEIGHT: 70 IN | DIASTOLIC BLOOD PRESSURE: 68 MMHG | BODY MASS INDEX: 28.35 KG/M2 | WEIGHT: 198 LBS | SYSTOLIC BLOOD PRESSURE: 122 MMHG

## 2019-10-02 DIAGNOSIS — Z3A.32 32 WEEKS GESTATION OF PREGNANCY: ICD-10-CM

## 2019-10-02 DIAGNOSIS — Z34.03 ENCOUNTER FOR SUPERVISION OF NORMAL FIRST PREGNANCY IN THIRD TRIMESTER: Primary | ICD-10-CM

## 2019-10-02 DIAGNOSIS — O34.219 UTERINE SCAR FROM PREVIOUS CESAREAN DELIVERY, WITH DELIVERY: ICD-10-CM

## 2019-10-02 DIAGNOSIS — L29.9 ITCHY SKIN: ICD-10-CM

## 2019-10-02 DIAGNOSIS — O09.523 ELDERLY MULTIGRAVIDA IN THIRD TRIMESTER: ICD-10-CM

## 2019-10-02 PROCEDURE — 0502F SUBSEQUENT PRENATAL CARE: CPT | Performed by: OBSTETRICS & GYNECOLOGY

## 2019-10-02 PROCEDURE — 76815 OB US LIMITED FETUS(S): CPT | Performed by: OBSTETRICS & GYNECOLOGY

## 2019-10-02 PROCEDURE — 59025 FETAL NON-STRESS TEST: CPT

## 2019-10-02 PROCEDURE — 76816 OB US FOLLOW-UP PER FETUS: CPT

## 2019-10-02 PROCEDURE — 81002 URINALYSIS NONAUTO W/O SCOPE: CPT | Performed by: OBSTETRICS & GYNECOLOGY

## 2019-10-05 LAB
ALBUMIN SERPL-MCNC: 3.4 G/DL (ref 3.5–5.5)
ALBUMIN/GLOB SERPL: 1.4 {RATIO} (ref 1.2–2.2)
ALP SERPL-CCNC: 88 IU/L (ref 39–117)
ALT SERPL-CCNC: 8 IU/L (ref 0–32)
AST SERPL-CCNC: 15 IU/L (ref 0–40)
BILIRUB SERPL-MCNC: 0.4 MG/DL (ref 0–1.2)
BUN SERPL-MCNC: 5 MG/DL (ref 6–24)
BUN/CREAT SERPL: 11 (ref 9–23)
CALCIUM SERPL-MCNC: 8.6 MG/DL (ref 8.7–10.2)
CHLORIDE SERPL-SCNC: 105 MMOL/L (ref 96–106)
CO2 SERPL-SCNC: 20 MMOL/L (ref 20–29)
CREAT SERPL-MCNC: 0.47 MG/DL (ref 0.57–1)
GLOBULIN SER CALC-MCNC: 2.4 G/DL (ref 1.5–4.5)
GLUCOSE SERPL-MCNC: 87 MG/DL (ref 65–99)
LAB CORP EGFR IF AFRICN AM: 141 ML/MIN/1.73
LAB CORP EGFR IF NONAFRICN AM: 122 ML/MIN/1.73
POTASSIUM SERPL-SCNC: 4.1 MMOL/L (ref 3.5–5.2)
PROT SERPL-MCNC: 5.8 G/DL (ref 6–8.5)
SODIUM SERPL-SCNC: 139 MMOL/L (ref 134–144)

## 2019-10-07 LAB — BILE AC SERPL-SCNC: 2.3 UMOL/L (ref 0–10)

## 2019-10-09 ENCOUNTER — TELEPHONE (OUTPATIENT)
Dept: OBSTETRICS AND GYNECOLOGY | Facility: CLINIC | Age: 44
End: 2019-10-09

## 2019-10-11 ENCOUNTER — HOSPITAL ENCOUNTER (OUTPATIENT)
Dept: PERINATAL CARE | Facility: HOSPITAL | Age: 44
Discharge: HOME | End: 2019-10-11
Attending: OBSTETRICS & GYNECOLOGY
Payer: COMMERCIAL

## 2019-10-11 VITALS — DIASTOLIC BLOOD PRESSURE: 63 MMHG | SYSTOLIC BLOOD PRESSURE: 102 MMHG

## 2019-10-11 PROCEDURE — 59025 FETAL NON-STRESS TEST: CPT

## 2019-10-15 ENCOUNTER — OFFICE VISIT (OUTPATIENT)
Dept: OBSTETRICS AND GYNECOLOGY | Facility: CLINIC | Age: 44
End: 2019-10-15
Payer: COMMERCIAL

## 2019-10-15 ENCOUNTER — HOSPITAL ENCOUNTER (OUTPATIENT)
Dept: PERINATAL CARE | Facility: HOSPITAL | Age: 44
Discharge: HOME | End: 2019-10-15
Attending: OBSTETRICS & GYNECOLOGY
Payer: COMMERCIAL

## 2019-10-15 VITALS
SYSTOLIC BLOOD PRESSURE: 102 MMHG | BODY MASS INDEX: 28.63 KG/M2 | HEIGHT: 70 IN | DIASTOLIC BLOOD PRESSURE: 62 MMHG | WEIGHT: 200 LBS

## 2019-10-15 VITALS — SYSTOLIC BLOOD PRESSURE: 96 MMHG | DIASTOLIC BLOOD PRESSURE: 53 MMHG

## 2019-10-15 DIAGNOSIS — O09.523 ELDERLY MULTIGRAVIDA IN THIRD TRIMESTER: ICD-10-CM

## 2019-10-15 DIAGNOSIS — Z3A.33 33 WEEKS GESTATION OF PREGNANCY: ICD-10-CM

## 2019-10-15 DIAGNOSIS — Z3A.34 34 WEEKS GESTATION OF PREGNANCY: ICD-10-CM

## 2019-10-15 DIAGNOSIS — Z34.03 ENCOUNTER FOR SUPERVISION OF NORMAL FIRST PREGNANCY IN THIRD TRIMESTER: Primary | ICD-10-CM

## 2019-10-15 LAB
BLOOD URINE, POC: NEGATIVE
GLUCOSE URINE, POC: NEGATIVE
LEUKOCYTE EST, POC: NEGATIVE
NITRITE, POC: NEGATIVE
PROTEIN, POC: ABNORMAL

## 2019-10-15 PROCEDURE — 0502F SUBSEQUENT PRENATAL CARE: CPT | Performed by: OBSTETRICS & GYNECOLOGY

## 2019-10-15 PROCEDURE — 81002 URINALYSIS NONAUTO W/O SCOPE: CPT | Performed by: OBSTETRICS & GYNECOLOGY

## 2019-10-15 PROCEDURE — 76815 OB US LIMITED FETUS(S): CPT

## 2019-10-15 NOTE — PRENATAL NOTE
Pt is here to evaluate rupture of membranes    Felt a gush of fluid last night  Had a normal CHARIS 15 at the time of her non stress test  Requested for pt to be off her feet for 1 hour each day to feel fetal movement  No Pooling during exam with sterile  speculum   Nitrazine Stripe  Negative  Jolly CAIN MA, am scribing for, and in the presence of, Pascual Beauchamp MD.    10/15/2019 1:51 PM

## 2019-10-16 ENCOUNTER — HOSPITAL ENCOUNTER (OUTPATIENT)
Facility: HOSPITAL | Age: 44
Discharge: HOME | End: 2019-10-16
Attending: OBSTETRICS & GYNECOLOGY | Admitting: OBSTETRICS & GYNECOLOGY
Payer: COMMERCIAL

## 2019-10-16 VITALS
HEART RATE: 69 BPM | SYSTOLIC BLOOD PRESSURE: 101 MMHG | DIASTOLIC BLOOD PRESSURE: 60 MMHG | BODY MASS INDEX: 29.62 KG/M2 | RESPIRATION RATE: 18 BRPM | HEIGHT: 69 IN | WEIGHT: 200 LBS | OXYGEN SATURATION: 94 % | TEMPERATURE: 98.4 F

## 2019-10-16 LAB
A1 MICROGLOB PLACENTAL VAG QL: NEGATIVE
CANDIDA RRNA VAG QL PROBE: NOT DETECTED
G VAGINALIS RRNA GENITAL QL PROBE: DETECTED
T VAGINALIS RRNA GENITAL QL PROBE: NOT DETECTED

## 2019-10-16 PROCEDURE — 87660 TRICHOMONAS VAGIN DIR PROBE: CPT | Performed by: STUDENT IN AN ORGANIZED HEALTH CARE EDUCATION/TRAINING PROGRAM

## 2019-10-16 PROCEDURE — 84112 EVAL AMNIOTIC FLUID PROTEIN: CPT | Performed by: STUDENT IN AN ORGANIZED HEALTH CARE EDUCATION/TRAINING PROGRAM

## 2019-10-16 PROCEDURE — 87150 DNA/RNA AMPLIFIED PROBE: CPT | Performed by: STUDENT IN AN ORGANIZED HEALTH CARE EDUCATION/TRAINING PROGRAM

## 2019-10-16 PROCEDURE — 99234 HOSP IP/OBS SM DT SF/LOW 45: CPT | Performed by: OBSTETRICS & GYNECOLOGY

## 2019-10-16 PROCEDURE — 87081 CULTURE SCREEN ONLY: CPT | Performed by: STUDENT IN AN ORGANIZED HEALTH CARE EDUCATION/TRAINING PROGRAM

## 2019-10-16 SDOH — ECONOMIC STABILITY: FOOD INSECURITY: WITHIN THE PAST 12 MONTHS, THE FOOD YOU BOUGHT JUST DIDN'T LAST AND YOU DIDN'T HAVE MONEY TO GET MORE.: NEVER TRUE

## 2019-10-16 SDOH — ECONOMIC STABILITY: FOOD INSECURITY: HOW HARD IS IT FOR YOU TO PAY FOR THE VERY BASICS LIKE FOOD, HOUSING, MEDICAL CARE, AND HEATING?: NOT HARD AT ALL

## 2019-10-16 SDOH — ECONOMIC STABILITY: FOOD INSECURITY: WITHIN THE PAST 12 MONTHS, YOU WORRIED THAT YOUR FOOD WOULD RUN OUT BEFORE YOU GOT THE MONEY TO BUY MORE.: NEVER TRUE

## 2019-10-16 SDOH — ECONOMIC STABILITY: TRANSPORTATION INSECURITY: IN THE PAST 12 MONTHS, HAS LACK OF TRANSPORTATION KEPT YOU FROM MEDICAL APPOINTMENTS OR FROM GETTING MEDICATIONS?: NO

## 2019-10-16 ASSESSMENT — ACTIVITIES OF DAILY LIVING (ADL): LACK_OF_TRANSPORTATION: NO

## 2019-10-16 NOTE — H&P
HPI     Frances Oconnor is a 44 y.o. female  at 34w2d with an estimated due date of 2019, by 1 U/S who presents with leakage of clear fluid which occurred at approximately 0600 this morning when the patient woke up. She denies any leakage since that time. She denies abnormal vaginal discharge, dysuria or frequency. Of note, patient has been feeling intermittent leakage the last 2 days, and was ruled out in the office yesterday. Bedside CHARIS yesterday was 13cm per Dr. Beauchamp and was 15cm per the PTC 10/15. Patient did have sexual intercourse 4 days ago and noted leakage 2 days following this. She denies any contractions or VB. She endorses good fetal movement.     Pregnancy complications: AMA, Hypothyroid, Rh-    OB History:   OB History    Para Term  AB Living   6 2 2 0 3 2   SAB TAB Ectopic Multiple Live Births   3 0 0 0 2      # Outcome Date GA Lbr Randolph/2nd Weight Sex Delivery Anes PTL Lv   6 Current            5 Term    3.629 kg (8 lb) M CS-LTranv  N NENA   4 SAB 2016           3 SAB 2015           2 Term    4.536 kg (10 lb) F CS-LTranv   NENA      Complications: Failure to Progress in First Stage   1 SAB                Medical History:   Past Medical History:   Diagnosis Date   • Asthma     needed inhaler with this pregnancy- usually with other infections   • Hypothyroidism     armour-thyroid 90mg BID   • Miscarriage     x3   • Urinary tract infection        Surgical History:   Past Surgical History:   Procedure Laterality Date   •  SECTION      x2   • DILATION AND EVACUATION      2017 2 weeks postpartum: lochia block   • WISDOM TOOTH EXTRACTION         Social History:   Social History     Socioeconomic History   • Marital status:      Spouse name: Ritesh   • Number of children: 2   • Years of education: 16   • Highest education level: Bachelor's degree (e.g., BA, AB, BS)   Occupational History   • Occupation: homemaker   Social Needs   • Financial resource  strain: Not hard at all   • Food insecurity:     Worry: Never true     Inability: Never true   • Transportation needs:     Medical: No     Non-medical: No   Tobacco Use   • Smoking status: Never Smoker   • Smokeless tobacco: Never Used   Substance and Sexual Activity   • Alcohol use: No   • Drug use: No   • Sexual activity: Yes     Partners: Male     Birth control/protection: None   Lifestyle   • Physical activity:     Days per week: None     Minutes per session: None   • Stress: None   Relationships   • Social connections:     Talks on phone: None     Gets together: None     Attends Judaism service: None     Active member of club or organization: None     Attends meetings of clubs or organizations: None     Relationship status: None   • Intimate partner violence:     Fear of current or ex partner: None     Emotionally abused: None     Physically abused: None     Forced sexual activity: None   Other Topics Concern   • None   Social History Narrative   • None        Family History:   Family History   Problem Relation Age of Onset   • Hypertension Biological Father    • Cancer Biological Father    • Cancer Paternal Grandfather        Allergies: Latex and Levofloxacin    Prior to Admission medications    Medication Sig Start Date End Date Taking? Authorizing Provider   clotrimazole-betamethasone (LOTRISONE) 1-0.05 % cream Apply topically 2 (two) times a day.  Patient not taking: Reported on 4/2/2019 12/20/18 1/19/19  Pascual Beauchamp MD   fluconazole (DIFLUCAN) 100 mg tablet Take 1 tablet (100 mg total) by mouth daily for 3 days.  Patient not taking: Reported on 4/2/2019  10/15/18 10/18/18  Pascual Beauchamp MD   fluconazole (DIFLUCAN) 100 mg tablet Take 1 tablet (100 mg total) by mouth daily for 3 days.  Patient not taking: Reported on 4/2/2019 12/20/18 12/23/18  Pascual Beauchamp MD   prenatal 25-iron fum-folic-dha (PRENATAL-1) -200 mg-mcg-mg capsule Prenatal    Provider, MD Tuan   THYROID,PORK (ARMOUR  THYROID ORAL) Take 90 mg by mouth daily.      Provider, Historical, MD       Review of Systems  Pertinent items are noted in HPI.    Objective     Vital Signs for the last 24 hours:  Temp:  [36.9 °C (98.4 °F)] 36.9 °C (98.4 °F)  Heart Rate:  [69-86] 69  Resp:  [18] 18  BP: ()/(53-62) 101/60    Latest cervical exam: Deferred. Visually closed on SSE.     Additional Tests:   Sterile Speculum Exam: yes  Pooling: no  Nitrazine: no  Ferning: no  Bleeding: None  Vaginal Discharge: Thin, white and watery, slight odor    Fetal Monitoring:  FHR Baseline: 140  FHR Variability: moderate  FHR Accelerations: 15x15 x2  FHR Decelerations: absent    Contraction Frequency: [place] quiet    Exam:  General Appearance: Alert, cooperative, no acute distress  Lungs: Clear to auscultation bilaterally, respirations unlabored  Heart: Regular rate and rhythm, S1 and S2 normal, no murmur, rub or gallop  Abdomen: gravid, nontender  Genitalia: See vaginal exam  Extremities: no edema or calf tenderness  Neurologic: grossly intact without focal deficits    Ultrasounds:   PTC 10/2: Est. FW:    2298   gm     5 lb 1 oz      67   %  Placenta Anterior    Bedside Ultrasounds:   cephalic  CHARIS 13.44cm    Labs:  A-/rubella immune    Assessment/Plan     Frances Oconnor is a 44 y.o. female  at 34w2d admitted for LOF     1. FHR: Reactive  2. GBS: collected today   3. LOF: Patient negative for rupture based on SSE with negative pooling, negative ferning and negative nitrazine. CHARIS is largely unchanged at 13.44cm today from 15cm in PTC. Will send ROM Plus as patient is anxious about rupture and if negative will be reassuring to her. Will also send Affirm to rule out infection. Dr. Beauchamp to discuss results with patient outpatient. She is stable for discharge at this time.     Plan d/w Dr. Quynh Hernandez, DO

## 2019-10-16 NOTE — NURSING NOTE
Received patient from home with c/o lof since 0100 on 10/15.  Clear  No bleeding.  Pt reports + fm, no cramping or ctx noted.  Patient thinks she may have felt a dull cramp from time to time.  efm applied and admission started

## 2019-10-16 NOTE — TELEPHONE ENCOUNTER
Patient seen today for rule out rupture of membranes had a normal CHARIS negative pull negative nitrazine negative fern we did she send off a AmniSure which came back negative for rupture membranes she does have possible bacterial vaginosis for which we will treat with Cleocin suppositories left message

## 2019-10-16 NOTE — NURSING NOTE
Dr Hernandez in to talk with pt and review plan of care,  ROMplus, affirm and GBS sent per Dr Beauchamp.  D/c instructions reviewed and patient d/c to home

## 2019-10-16 NOTE — NURSING NOTE
Dr Rodriguez at bedside to talk with pt and explain sse and plan of care.  Afirm, romplus and gbs obtained along with sse.  Will send prn

## 2019-10-17 LAB
GP B STREP DNA SPEC QL NAA+PROBE: NEGATIVE
GP B STREP SPEC QL CULT: NORMAL

## 2019-10-23 ENCOUNTER — HOSPITAL ENCOUNTER (OUTPATIENT)
Dept: PERINATAL CARE | Facility: HOSPITAL | Age: 44
Discharge: HOME | End: 2019-10-23
Attending: OBSTETRICS & GYNECOLOGY
Payer: COMMERCIAL

## 2019-10-23 VITALS — DIASTOLIC BLOOD PRESSURE: 61 MMHG | SYSTOLIC BLOOD PRESSURE: 106 MMHG

## 2019-10-23 PROCEDURE — 76815 OB US LIMITED FETUS(S): CPT

## 2019-10-29 ENCOUNTER — HOSPITAL ENCOUNTER (OUTPATIENT)
Dept: PERINATAL CARE | Facility: HOSPITAL | Age: 44
Discharge: HOME | End: 2019-10-29
Attending: OBSTETRICS & GYNECOLOGY
Payer: COMMERCIAL

## 2019-10-29 VITALS — SYSTOLIC BLOOD PRESSURE: 100 MMHG | DIASTOLIC BLOOD PRESSURE: 58 MMHG

## 2019-10-29 DIAGNOSIS — Z3A.36 36 WEEKS GESTATION OF PREGNANCY: ICD-10-CM

## 2019-10-29 DIAGNOSIS — Z3A.32 32 WEEKS GESTATION OF PREGNANCY: ICD-10-CM

## 2019-10-29 DIAGNOSIS — E07.9 THYROID DISEASE DURING PREGNANCY IN THIRD TRIMESTER: ICD-10-CM

## 2019-10-29 DIAGNOSIS — O99.283 THYROID DISEASE DURING PREGNANCY IN THIRD TRIMESTER: ICD-10-CM

## 2019-10-29 DIAGNOSIS — O09.523 ELDERLY MULTIGRAVIDA IN THIRD TRIMESTER: ICD-10-CM

## 2019-10-29 PROCEDURE — 59025 FETAL NON-STRESS TEST: CPT

## 2019-10-29 PROCEDURE — 76805 OB US >/= 14 WKS SNGL FETUS: CPT

## 2019-11-06 ENCOUNTER — HOSPITAL ENCOUNTER (OUTPATIENT)
Dept: PERINATAL CARE | Facility: HOSPITAL | Age: 44
Discharge: HOME | End: 2019-11-06
Attending: OBSTETRICS & GYNECOLOGY
Payer: COMMERCIAL

## 2019-11-06 VITALS — DIASTOLIC BLOOD PRESSURE: 72 MMHG | SYSTOLIC BLOOD PRESSURE: 106 MMHG

## 2019-11-06 DIAGNOSIS — Z3A.36 36 WEEKS GESTATION OF PREGNANCY: ICD-10-CM

## 2019-11-06 DIAGNOSIS — E07.9 THYROID DISEASE DURING PREGNANCY IN THIRD TRIMESTER: ICD-10-CM

## 2019-11-06 DIAGNOSIS — O99.283 THYROID DISEASE DURING PREGNANCY IN THIRD TRIMESTER: ICD-10-CM

## 2019-11-06 PROCEDURE — 59025 FETAL NON-STRESS TEST: CPT

## 2019-11-12 ENCOUNTER — HOSPITAL ENCOUNTER (OUTPATIENT)
Dept: PERINATAL CARE | Facility: HOSPITAL | Age: 44
Discharge: HOME | End: 2019-11-12
Attending: OBSTETRICS & GYNECOLOGY
Payer: COMMERCIAL

## 2019-11-12 ENCOUNTER — OFFICE VISIT (OUTPATIENT)
Dept: OBSTETRICS AND GYNECOLOGY | Facility: CLINIC | Age: 44
End: 2019-11-12
Payer: COMMERCIAL

## 2019-11-12 VITALS — DIASTOLIC BLOOD PRESSURE: 70 MMHG | WEIGHT: 204 LBS | SYSTOLIC BLOOD PRESSURE: 120 MMHG | BODY MASS INDEX: 30.13 KG/M2

## 2019-11-12 DIAGNOSIS — O28.8 AFI (AMNIOTIC FLUID INDEX) BORDERLINE LOW: ICD-10-CM

## 2019-11-12 DIAGNOSIS — Z34.03 ENCOUNTER FOR SUPERVISION OF NORMAL FIRST PREGNANCY IN THIRD TRIMESTER: Primary | ICD-10-CM

## 2019-11-12 DIAGNOSIS — Z3A.38 38 WEEKS GESTATION OF PREGNANCY: ICD-10-CM

## 2019-11-12 PROCEDURE — 59025 FETAL NON-STRESS TEST: CPT

## 2019-11-12 PROCEDURE — 0502F SUBSEQUENT PRENATAL CARE: CPT | Performed by: OBSTETRICS & GYNECOLOGY

## 2019-11-12 PROCEDURE — 81002 URINALYSIS NONAUTO W/O SCOPE: CPT | Performed by: OBSTETRICS & GYNECOLOGY

## 2019-11-12 PROCEDURE — 76815 OB US LIMITED FETUS(S): CPT | Performed by: OBSTETRICS & GYNECOLOGY

## 2019-11-20 ENCOUNTER — ANESTHESIA EVENT (INPATIENT)
Dept: OBSTETRICS AND GYNECOLOGY | Facility: HOSPITAL | Age: 44
End: 2019-11-20
Payer: COMMERCIAL

## 2019-11-20 ENCOUNTER — ANESTHESIA (INPATIENT)
Dept: OBSTETRICS AND GYNECOLOGY | Facility: HOSPITAL | Age: 44
End: 2019-11-20
Payer: COMMERCIAL

## 2019-11-20 ENCOUNTER — HOSPITAL ENCOUNTER (INPATIENT)
Facility: HOSPITAL | Age: 44
LOS: 2 days | Discharge: HOME | End: 2019-11-22
Attending: OBSTETRICS & GYNECOLOGY | Admitting: OBSTETRICS & GYNECOLOGY
Payer: COMMERCIAL

## 2019-11-20 PROBLEM — O42.90 PREMATURE RUPTURE OF MEMBRANES: Status: ACTIVE | Noted: 2019-11-20

## 2019-11-20 LAB
ABO + RH BLD: NORMAL
BLD GP AB SCN SERPL QL: POSITIVE
D AG BLD QL: NEGATIVE
ERYTHROCYTE [DISTWIDTH] IN BLOOD BY AUTOMATED COUNT: 15.9 % (ref 11.7–14.4)
HCT VFR BLDCO AUTO: 31.5 %
HGB BLD-MCNC: 9.5 G/DL (ref 11.8–15.7)
LABORATORY COMMENT REPORT: NORMAL
MCH RBC QN AUTO: 24.7 PG (ref 28–33.2)
MCHC RBC AUTO-ENTMCNC: 30.2 G/DL (ref 32.2–35.5)
MCV RBC AUTO: 81.8 FL (ref 83–98)
PDW BLD AUTO: 12.9 FL (ref 9.4–12.3)
PLATELET # BLD AUTO: 148 K/UL
RBC # BLD AUTO: 3.85 M/UL (ref 3.93–5.22)
SELECTED CELL PANEL: NORMAL
T PALLIDUM AB SER QL IF: NONREACTIVE
WBC # BLD AUTO: 10.29 K/UL

## 2019-11-20 PROCEDURE — 71000001 HC PACU PHASE 1 INITIAL 30MIN: Performed by: OBSTETRICS & GYNECOLOGY

## 2019-11-20 PROCEDURE — 25000000 HC PHARMACY GENERAL: Performed by: STUDENT IN AN ORGANIZED HEALTH CARE EDUCATION/TRAINING PROGRAM

## 2019-11-20 PROCEDURE — 63600000 HC DRUGS/DETAIL CODE: Performed by: ANESTHESIOLOGY

## 2019-11-20 PROCEDURE — 200200 PR NO CHARGE: Performed by: OBSTETRICS & GYNECOLOGY

## 2019-11-20 PROCEDURE — 86780 TREPONEMA PALLIDUM: CPT | Performed by: STUDENT IN AN ORGANIZED HEALTH CARE EDUCATION/TRAINING PROGRAM

## 2019-11-20 PROCEDURE — 63600000 HC DRUGS/DETAIL CODE: Performed by: STUDENT IN AN ORGANIZED HEALTH CARE EDUCATION/TRAINING PROGRAM

## 2019-11-20 PROCEDURE — 87340 HEPATITIS B SURFACE AG IA: CPT | Performed by: STUDENT IN AN ORGANIZED HEALTH CARE EDUCATION/TRAINING PROGRAM

## 2019-11-20 PROCEDURE — 25000000 HC PHARMACY GENERAL: Performed by: ANESTHESIOLOGY

## 2019-11-20 PROCEDURE — 12000000 HC ROOM AND CARE MED/SURG

## 2019-11-20 PROCEDURE — 63600000 HC DRUGS/DETAIL CODE: Performed by: OBSTETRICS & GYNECOLOGY

## 2019-11-20 PROCEDURE — 27200121 HC CATH FOLEY

## 2019-11-20 PROCEDURE — 36415 COLL VENOUS BLD VENIPUNCTURE: CPT | Performed by: STUDENT IN AN ORGANIZED HEALTH CARE EDUCATION/TRAINING PROGRAM

## 2019-11-20 PROCEDURE — 71000011 HC PACU PHASE 1 EA ADDL MIN: Performed by: OBSTETRICS & GYNECOLOGY

## 2019-11-20 PROCEDURE — 63700000 HC SELF-ADMINISTRABLE DRUG: Performed by: STUDENT IN AN ORGANIZED HEALTH CARE EDUCATION/TRAINING PROGRAM

## 2019-11-20 PROCEDURE — 59515 CESAREAN DELIVERY: CPT | Performed by: OBSTETRICS & GYNECOLOGY

## 2019-11-20 PROCEDURE — 72000021 HC C SECTION LEVEL 1: Performed by: OBSTETRICS & GYNECOLOGY

## 2019-11-20 PROCEDURE — 86900 BLOOD TYPING SEROLOGIC ABO: CPT

## 2019-11-20 PROCEDURE — 37000010 HC ANESTHESIA SPINAL: Performed by: OBSTETRICS & GYNECOLOGY

## 2019-11-20 PROCEDURE — 85027 COMPLETE CBC AUTOMATED: CPT | Performed by: STUDENT IN AN ORGANIZED HEALTH CARE EDUCATION/TRAINING PROGRAM

## 2019-11-20 RX ORDER — DIBUCAINE 1 %
1 OINTMENT (GRAM) TOPICAL AS NEEDED
Status: DISCONTINUED | OUTPATIENT
Start: 2019-11-20 | End: 2019-11-22 | Stop reason: HOSPADM

## 2019-11-20 RX ORDER — BISACODYL 10 MG/1
10 SUPPOSITORY RECTAL DAILY PRN
Status: DISCONTINUED | OUTPATIENT
Start: 2019-11-20 | End: 2019-11-22 | Stop reason: HOSPADM

## 2019-11-20 RX ORDER — CARBOPROST TROMETHAMINE 250 UG/ML
250 INJECTION, SOLUTION INTRAMUSCULAR ONCE AS NEEDED
Status: DISCONTINUED | OUTPATIENT
Start: 2019-11-20 | End: 2019-11-22 | Stop reason: HOSPADM

## 2019-11-20 RX ORDER — SODIUM CITRATE AND CITRIC ACID MONOHYDRATE 334; 500 MG/5ML; MG/5ML
30 SOLUTION ORAL ONCE
Status: COMPLETED | OUTPATIENT
Start: 2019-11-20 | End: 2019-11-20

## 2019-11-20 RX ORDER — CALCIUM CARBONATE 200(500)MG
500 TABLET,CHEWABLE ORAL EVERY 4 HOURS PRN
Status: DISCONTINUED | OUTPATIENT
Start: 2019-11-20 | End: 2019-11-22 | Stop reason: HOSPADM

## 2019-11-20 RX ORDER — DIPHENHYDRAMINE HCL 25 MG
25 CAPSULE ORAL EVERY 6 HOURS PRN
Status: DISCONTINUED | OUTPATIENT
Start: 2019-11-20 | End: 2019-11-22 | Stop reason: HOSPADM

## 2019-11-20 RX ORDER — OXYTOCIN 10 [USP'U]/ML
10 INJECTION, SOLUTION INTRAMUSCULAR; INTRAVENOUS ONCE AS NEEDED
Status: DISCONTINUED | OUTPATIENT
Start: 2019-11-20 | End: 2019-11-22 | Stop reason: HOSPADM

## 2019-11-20 RX ORDER — CEFAZOLIN SODIUM 2 G/50ML
2 SOLUTION INTRAVENOUS
Status: COMPLETED | OUTPATIENT
Start: 2019-11-20 | End: 2019-11-20

## 2019-11-20 RX ORDER — OXYTOCIN/0.9 % SODIUM CHLORIDE 20/1000 ML
125 PLASTIC BAG, INJECTION (ML) INTRAVENOUS CONTINUOUS
Status: ACTIVE | OUTPATIENT
Start: 2019-11-20 | End: 2019-11-20

## 2019-11-20 RX ORDER — SODIUM CHLORIDE, SODIUM LACTATE, POTASSIUM CHLORIDE, CALCIUM CHLORIDE 600; 310; 30; 20 MG/100ML; MG/100ML; MG/100ML; MG/100ML
150 INJECTION, SOLUTION INTRAVENOUS CONTINUOUS
Status: DISCONTINUED | OUTPATIENT
Start: 2019-11-20 | End: 2019-11-20

## 2019-11-20 RX ORDER — DIPHENHYDRAMINE HCL 50 MG/ML
25 VIAL (ML) INJECTION EVERY 6 HOURS PRN
Status: DISCONTINUED | OUTPATIENT
Start: 2019-11-20 | End: 2019-11-22 | Stop reason: HOSPADM

## 2019-11-20 RX ORDER — POLYETHYLENE GLYCOL 3350 17 G/17G
17 POWDER, FOR SOLUTION ORAL DAILY PRN
Status: DISCONTINUED | OUTPATIENT
Start: 2019-11-20 | End: 2019-11-22 | Stop reason: HOSPADM

## 2019-11-20 RX ORDER — ONDANSETRON HYDROCHLORIDE 2 MG/ML
4 INJECTION, SOLUTION INTRAVENOUS EVERY 8 HOURS PRN
Status: DISCONTINUED | OUTPATIENT
Start: 2019-11-20 | End: 2019-11-22 | Stop reason: HOSPADM

## 2019-11-20 RX ORDER — ACETAMINOPHEN 650 MG/20.3ML
1000 LIQUID ORAL ONCE
Status: COMPLETED | OUTPATIENT
Start: 2019-11-20 | End: 2019-11-20

## 2019-11-20 RX ORDER — MORPHINE SULFATE 0.5 MG/ML
INJECTION, SOLUTION EPIDURAL; INTRATHECAL; INTRAVENOUS AS NEEDED
Status: DISCONTINUED | OUTPATIENT
Start: 2019-11-20 | End: 2019-11-21 | Stop reason: SURG

## 2019-11-20 RX ORDER — ACETAMINOPHEN 650 MG/1
650 SUPPOSITORY RECTAL ONCE
Status: COMPLETED | OUTPATIENT
Start: 2019-11-20 | End: 2019-11-20

## 2019-11-20 RX ORDER — KETOROLAC TROMETHAMINE 30 MG/ML
30 INJECTION, SOLUTION INTRAMUSCULAR; INTRAVENOUS
Status: DISCONTINUED | OUTPATIENT
Start: 2019-11-20 | End: 2019-11-22

## 2019-11-20 RX ORDER — BUPIVACAINE HYDROCHLORIDE 7.5 MG/ML
INJECTION, SOLUTION INTRASPINAL AS NEEDED
Status: DISCONTINUED | OUTPATIENT
Start: 2019-11-20 | End: 2019-11-21 | Stop reason: SURG

## 2019-11-20 RX ORDER — OXYCODONE HYDROCHLORIDE 5 MG/1
5-10 TABLET ORAL EVERY 4 HOURS PRN
Status: DISCONTINUED | OUTPATIENT
Start: 2019-11-20 | End: 2019-11-22 | Stop reason: HOSPADM

## 2019-11-20 RX ORDER — AMOXICILLIN 250 MG
1 CAPSULE ORAL 2 TIMES DAILY
Status: DISCONTINUED | OUTPATIENT
Start: 2019-11-20 | End: 2019-11-22 | Stop reason: HOSPADM

## 2019-11-20 RX ORDER — OXYTOCIN/0.9 % SODIUM CHLORIDE 20/1000 ML
PLASTIC BAG, INJECTION (ML) INTRAVENOUS CONTINUOUS PRN
Status: DISCONTINUED | OUTPATIENT
Start: 2019-11-20 | End: 2019-11-21 | Stop reason: SURG

## 2019-11-20 RX ORDER — METHYLERGONOVINE MALEATE 0.2 MG/ML
200 INJECTION INTRAVENOUS ONCE AS NEEDED
Status: DISCONTINUED | OUTPATIENT
Start: 2019-11-20 | End: 2019-11-22 | Stop reason: HOSPADM

## 2019-11-20 RX ORDER — HYDROMORPHONE HYDROCHLORIDE 1 MG/ML
.5-1 INJECTION, SOLUTION INTRAMUSCULAR; INTRAVENOUS; SUBCUTANEOUS
Status: DISCONTINUED | OUTPATIENT
Start: 2019-11-20 | End: 2019-11-22 | Stop reason: HOSPADM

## 2019-11-20 RX ORDER — SILVER SULFADIAZINE 10 G/1000G
CREAM TOPICAL 2 TIMES DAILY PRN
Status: DISCONTINUED | OUTPATIENT
Start: 2019-11-20 | End: 2019-11-22 | Stop reason: HOSPADM

## 2019-11-20 RX ORDER — ACETAMINOPHEN 325 MG/1
975 TABLET ORAL ONCE
Status: COMPLETED | OUTPATIENT
Start: 2019-11-20 | End: 2019-11-20

## 2019-11-20 RX ORDER — EPHEDRINE SULFATE 50 MG/ML
INJECTION, SOLUTION INTRAVENOUS AS NEEDED
Status: DISCONTINUED | OUTPATIENT
Start: 2019-11-20 | End: 2019-11-21 | Stop reason: SURG

## 2019-11-20 RX ORDER — SODIUM CHLORIDE, SODIUM LACTATE, POTASSIUM CHLORIDE, CALCIUM CHLORIDE 600; 310; 30; 20 MG/100ML; MG/100ML; MG/100ML; MG/100ML
80 INJECTION, SOLUTION INTRAVENOUS CONTINUOUS
Status: ACTIVE | OUTPATIENT
Start: 2019-11-20 | End: 2019-11-21

## 2019-11-20 RX ORDER — ACETAMINOPHEN 325 MG/1
975 TABLET ORAL
Status: DISCONTINUED | OUTPATIENT
Start: 2019-11-20 | End: 2019-11-22 | Stop reason: HOSPADM

## 2019-11-20 RX ORDER — FERROUS SULFATE 325(65) MG
325 TABLET ORAL 2 TIMES DAILY WITH MEALS
Status: DISCONTINUED | OUTPATIENT
Start: 2019-11-20 | End: 2019-11-22 | Stop reason: HOSPADM

## 2019-11-20 RX ORDER — ONDANSETRON 8 MG/1
8 TABLET, ORALLY DISINTEGRATING ORAL ONCE
Status: COMPLETED | OUTPATIENT
Start: 2019-11-20 | End: 2019-11-20

## 2019-11-20 RX ORDER — ALUMINUM HYDROXIDE, MAGNESIUM HYDROXIDE, AND SIMETHICONE 1200; 120; 1200 MG/30ML; MG/30ML; MG/30ML
30 SUSPENSION ORAL EVERY 4 HOURS PRN
Status: DISCONTINUED | OUTPATIENT
Start: 2019-11-20 | End: 2019-11-22 | Stop reason: HOSPADM

## 2019-11-20 RX ORDER — IBUPROFEN 600 MG/1
600 TABLET ORAL
Status: DISCONTINUED | OUTPATIENT
Start: 2019-11-22 | End: 2019-11-22

## 2019-11-20 RX ORDER — MISOPROSTOL 100 UG/1
1000 TABLET ORAL ONCE AS NEEDED
Status: DISCONTINUED | OUTPATIENT
Start: 2019-11-20 | End: 2019-11-22 | Stop reason: HOSPADM

## 2019-11-20 RX ORDER — ONDANSETRON 4 MG/1
4 TABLET, ORALLY DISINTEGRATING ORAL EVERY 8 HOURS PRN
Status: DISCONTINUED | OUTPATIENT
Start: 2019-11-20 | End: 2019-11-22 | Stop reason: HOSPADM

## 2019-11-20 RX ORDER — PHENYLEPHRINE HYDROCHLORIDE 10 MG/ML
INJECTION INTRAVENOUS AS NEEDED
Status: DISCONTINUED | OUTPATIENT
Start: 2019-11-20 | End: 2019-11-21 | Stop reason: SURG

## 2019-11-20 RX ADMIN — SENNOSIDES AND DOCUSATE SODIUM 1 TABLET: 8.6; 5 TABLET ORAL at 21:18

## 2019-11-20 RX ADMIN — KETOROLAC TROMETHAMINE 30 MG: 30 INJECTION, SOLUTION INTRAMUSCULAR; INTRAVENOUS at 11:38

## 2019-11-20 RX ADMIN — ONDANSETRON 8 MG: 8 TABLET, ORALLY DISINTEGRATING ORAL at 08:33

## 2019-11-20 RX ADMIN — BUPIVACAINE HYDROCHLORIDE IN DEXTROSE 2 ML: 7.5 INJECTION, SOLUTION SUBARACHNOID at 08:57

## 2019-11-20 RX ADMIN — NITROGLYCERIN 500 MG: 5 INJECTION, SOLUTION INTRAVENOUS at 09:05

## 2019-11-20 RX ADMIN — PHENYLEPHRINE HYDROCHLORIDE 200 MCG: 10 INJECTION INTRAVENOUS at 09:19

## 2019-11-20 RX ADMIN — Medication 125 ML/HR: at 15:40

## 2019-11-20 RX ADMIN — PHENYLEPHRINE HYDROCHLORIDE 150 MCG: 10 INJECTION INTRAVENOUS at 09:30

## 2019-11-20 RX ADMIN — ACETAMINOPHEN 975 MG: 325 TABLET, FILM COATED ORAL at 15:31

## 2019-11-20 RX ADMIN — SODIUM CHLORIDE, POTASSIUM CHLORIDE, SODIUM LACTATE AND CALCIUM CHLORIDE 80 ML/HR: 600; 310; 30; 20 INJECTION, SOLUTION INTRAVENOUS at 21:19

## 2019-11-20 RX ADMIN — SODIUM CHLORIDE, SODIUM LACTATE, POTASSIUM CHLORIDE, CALCIUM CHLORIDE: 600; 310; 30; 20 INJECTION, SOLUTION INTRAVENOUS at 08:54

## 2019-11-20 RX ADMIN — PHENYLEPHRINE HYDROCHLORIDE 200 MCG: 10 INJECTION INTRAVENOUS at 09:37

## 2019-11-20 RX ADMIN — SODIUM CITRATE AND CITRIC ACID MONOHYDRATE 30 ML: 500; 334 SOLUTION ORAL at 08:33

## 2019-11-20 RX ADMIN — CEFAZOLIN 2 G: 10 INJECTION, POWDER, FOR SOLUTION INTRAVENOUS at 08:34

## 2019-11-20 RX ADMIN — EPHEDRINE SULFATE 5 MG: 50 INJECTION INTRAVENOUS at 09:46

## 2019-11-20 RX ADMIN — KETOROLAC TROMETHAMINE 30 MG: 30 INJECTION, SOLUTION INTRAMUSCULAR; INTRAVENOUS at 17:43

## 2019-11-20 RX ADMIN — MORPHINE SULFATE 0.2 MG: 0.5 INJECTION, SOLUTION EPIDURAL; INTRATHECAL; INTRAVENOUS at 08:57

## 2019-11-20 RX ADMIN — ACETAMINOPHEN 975 MG: 325 TABLET, FILM COATED ORAL at 08:33

## 2019-11-20 RX ADMIN — NITROGLYCERIN 500 MG: 5 INJECTION, SOLUTION INTRAVENOUS at 08:38

## 2019-11-20 RX ADMIN — Medication: at 09:29

## 2019-11-20 RX ADMIN — SODIUM CHLORIDE, POTASSIUM CHLORIDE, SODIUM LACTATE AND CALCIUM CHLORIDE 500 ML: 600; 310; 30; 20 INJECTION, SOLUTION INTRAVENOUS at 06:24

## 2019-11-20 RX ADMIN — PHENYLEPHRINE HYDROCHLORIDE 200 MCG: 10 INJECTION INTRAVENOUS at 09:46

## 2019-11-20 RX ADMIN — PHENYLEPHRINE HYDROCHLORIDE 200 MCG: 10 INJECTION INTRAVENOUS at 09:22

## 2019-11-20 RX ADMIN — PHENYLEPHRINE HYDROCHLORIDE 200 MCG: 10 INJECTION INTRAVENOUS at 09:24

## 2019-11-20 RX ADMIN — ACETAMINOPHEN 975 MG: 325 TABLET, FILM COATED ORAL at 21:18

## 2019-11-20 SDOH — HEALTH STABILITY: MENTAL HEALTH: HOW OFTEN DO YOU HAVE A DRINK CONTAINING ALCOHOL?: NEVER

## 2019-11-20 SDOH — HEALTH STABILITY: MENTAL HEALTH: HOW OFTEN DO YOU HAVE SIX OR MORE DRINKS ON ONE OCCASION?: NEVER

## 2019-11-20 NOTE — NURSING NOTE
0979 clamped arguelles and retrograde bladder filled with blue dye as per Dr Beauchamp  9548 clamp removed; blue dye noted draining into arguelles bag

## 2019-11-20 NOTE — ANESTHESIA PROCEDURE NOTES
Spinal Block    Patient location during procedure: OR  Start time: 11/20/2019 8:57 AM  End time: 11/20/2019 8:59 AM  Staffing  Anesthesiologist: Vinicius Hughes MD  Performed: anesthesiologist   Reason for block: labor analgesia requested by patient and obstetrician  Preanesthetic Checklist  Completed: patient identified, surgical consent, pre-op evaluation, timeout performed, IV checked, risks and benefits discussed, monitors and equipment checked and sterile field maintained during procedure  Spinal Block  Patient position: sitting  Prep: ChloraPrep and site prepped and draped  Patient monitoring: heart rate, cardiac monitor, continuous pulse ox and blood pressure  Approach: midline  Location: L3-4  Injection technique: single-shot  Needle  Needle type: Sprotte   Needle gauge: 24 G  Needle length: 3.5 in  Assessment  Sensory level: T4  Events: cerebrospinal fluid  Additional Notes  Procedure well tolerated. Vital signs stable.

## 2019-11-20 NOTE — H&P
"  HPI     Frances Oconnor is a 44 y.o. female  at 39w2d with an estimated due date of 2019, by [facility] us who presents with LOF. Patient reports breaking her water at 0300 for clear fluid, soaking through multiple towels. She is feeling mild cramps, \"less Intense than a heavy menstrual cramp.\" Denies VB, reports good fetal movement. Pregnancy largely uncomplicated aside from AMA and hypothyroidism. She was scheduled for RCS later this mornig.     Last PO intake:  Last Food Intake Date: 19, Last Food Intake Time: 0100  Last Fluid Intake Date: 19, Last Fluid Intake Time: 0030    OB History:   OB History    Para Term  AB Living   6 2 2 0 3 2   SAB TAB Ectopic Multiple Live Births   3 0 0 0 2      # Outcome Date GA Lbr Randolph/2nd Weight Sex Delivery Anes PTL Lv   6 Current            5 Term    3.629 kg (8 lb) M CS-LTranv  N NENA   4 SAB            3 SAB            2 Term    4.536 kg (10 lb) F CS-LTranv   NENA      Complications: Failure to Progress in First Stage   1 SAB                Medical History:   Past Medical History:   Diagnosis Date   • Asthma     needed inhaler with this pregnancy- usually with other infections   • BV (bacterial vaginosis)    • Hypothyroidism     armour-thyroid 90mg BID   • Miscarriage     x3   • Urinary tract infection        Surgical History:   Past Surgical History:   Procedure Laterality Date   •  SECTION      x2   • DILATION AND EVACUATION      2017 2 weeks postpartum: lochia block   • WISDOM TOOTH EXTRACTION         Social History:   Social History     Socioeconomic History   • Marital status:      Spouse name: Ritesh   • Number of children: 2   • Years of education: 16   • Highest education level: Bachelor's degree (e.g., BA, AB, BS)   Occupational History   • Occupation: homemaker   Social Needs   • Financial resource strain: Not hard at all   • Food insecurity:     Worry: Never true     Inability: Never true   • " Transportation needs:     Medical: No     Non-medical: No   Tobacco Use   • Smoking status: Never Smoker   • Smokeless tobacco: Never Used   Substance and Sexual Activity   • Alcohol use: No     Frequency: Never     Binge frequency: Never   • Drug use: No   • Sexual activity: Yes     Partners: Male     Birth control/protection: None   Lifestyle   • Physical activity:     Days per week: None     Minutes per session: None   • Stress: None   Relationships   • Social connections:     Talks on phone: None     Gets together: None     Attends Mosque service: None     Active member of club or organization: None     Attends meetings of clubs or organizations: None     Relationship status: None   • Intimate partner violence:     Fear of current or ex partner: None     Emotionally abused: None     Physically abused: None     Forced sexual activity: None   Other Topics Concern   • None   Social History Narrative   • None        Family History:   Family History   Problem Relation Age of Onset   • Hypertension Biological Father    • Cancer Biological Father    • Cancer Paternal Grandfather        Allergies: Levofloxacin    Prior to Admission medications    Medication Sig Start Date End Date Taking? Authorizing Provider   THYROID,PORK (ARMOUR THYROID ORAL) Take 90 mg by mouth daily.     Yes Provider, MD Tuan   clindamycin (CLEOCIN) 100 mg vaginal suppository Insert 1 suppository (100 mg total) into the vagina nightly for 7 days. 10/16/19 10/23/19  Pascual Beauchamp MD   clotrimazole-betamethasone (LOTRISONE) 1-0.05 % cream Apply topically 2 (two) times a day.  Patient not taking: Reported on 4/2/2019 12/20/18 1/19/19  Pascual Beauchamp MD   fluconazole (DIFLUCAN) 100 mg tablet Take 1 tablet (100 mg total) by mouth daily for 3 days.  Patient not taking: Reported on 4/2/2019  10/15/18 10/18/18  Pascual Beauchamp MD   fluconazole (DIFLUCAN) 100 mg tablet Take 1 tablet (100 mg total) by mouth daily for 3 days.  Patient not  taking: Reported on 18  Pascual Beauchamp MD   prenatal 25-iron fum-folic-dha (PRENATAL-1) -200 mg-mcg-mg capsule Prenatal    Provider, MD Tuan       Review of Systems  Pertinent items are noted in HPI.    Objective     Vital Signs for the last 24 hours:  Temp:  [37 °C (98.6 °F)] 37 °C (98.6 °F)  Heart Rate:  [72-78] 72  Resp:  [18] 18  BP: ()/(51-68) 82/51    Latest cervical exam:  Cervical Dilation (cm): 0-1        Method: sterile exam per physician (19 0545)    Additional Tests:   Sterile Speculum Exam: no  Patient grossly ruptured- clear fluid    Fetal Monitoring:  FHR Baseline: 130  FHR Variability: mod  FHR Accelerations: present  FHR Decelerations: absent    Contraction Frequency: q3-6    Exam:  General Appearance: Alert, cooperative, no acute distress  Lungs: Clear to auscultation bilaterally, respirations unlabored  Heart: Regular rate and rhythm  Abdomen: gravid, nontender  Genitalia: See vaginal exam  Extremities: no edema or calf tenderness  Neurologic: grossly intact without focal deficits    Ultrasounds:   I have reviewed the applicable Ultrasounds.    Bedside Ultrasounds:   cephalic      Labs:   Prenatal Labs:   · Blood type: A-   · RPR: negative   · Syphilis Antibody: not done   · HepBsAg: unknown   · Rubella: immune   · Rubeola: not done   · Varicella: non-immune    · HIV: negative  · Glucose tolerance testing: normal  · Group B Strep: negative  · Gonorrhea: negative  · Chlamydia: negative    Assessment/Plan     Frances Oconnor is a 44 y.o. female  at 39w2d admitted for      FHR: Category I  GBS: negative   ROM: Patient grossly ruptured for clear fluid. Cervix fingertip and posterior. Andrés on TOCO but not uncomfortable. Will proceed with RCS when Dr. Beauchamp arrives. Ancef and azithromycin on call to OR. Patient understands that we may need to move sooner if she becomes more uncomfortable with contractions.     D/w Dr. Ocasio      Faith Avilez MD

## 2019-11-20 NOTE — L&D DELIVERY NOTE
OB  Delivery OP Note    Date of Procedure: 2019  Patient:Frances Oconnor  :1975    Procedure:     SECTION  CPT(R) Code:  44173 - SD FULL ROUT OBSTE CARE, DELIV     Review the Delivery Report for details.      Details    Pre-Op Diagnosis: 1. 44 y.o.  Intrauterine pregnancy at 39w2d  2. RCS  3. PROM  4. Retrograde back fill of the bladder   Post-Op Diagnosis: 1. Same with delivery of a viable female     Procedure: Repeat  , Low Transverse  via Low Transverse uterine incision and excision old scar skin incision.   Anesthesia: Spinal    Findings: Normal uterus, tubes, and ovaries.   EBL  800 mL   Complications: None     Specimen Information:  Cord blood X2  Drains: Brenner draining blue     Staff:  Surgeon(s):  Pascual Beauchamp MD Brown, Lisa M, MD  Anesthesiologist: Vinicius Hughes MD   Circulator: Brandee Herman RN  Scrub Person: Lilibeth Pulliam  Baby Nurse: Hiral Huerta RN  Neonatologist: Frances Guadalupe MD  Other Staff:  SAHIL BLUM;BRANDEE HERMAN;HIRAL HUERTA  Delivery Assist;Delivery Nurse;Delivery Nurse     INFANT INFORMATION  Time of Birth:9:29 AM   Presentation:    cephalic   Position:right occiput transverse   Cord: 3 vessels ,Complications:None   [place] Sex: female   [place] Weight: 3.85 kg (8 lb 7.8 oz)      1 Minute 5 Minute 10 Minute   Apgar Totals: 9    9            Information for the patient's :  Mariano Oconnor [[id number]]     [place] Cord Gas     None          Informed Consent:  An informed consent was obtained.    Procedure Details:  The patient was admitted on 19 who was admitted for prelabor rupture of membranes for clear fluid. She happened to be scheduled for her repeat  today. The patient was taken to the operating room where Spinal  anesthesia was placed and found to be adequate. Antibiotics were given for infection prophylaxis. The patient was prepped and draped in the  normal sterile fashion.  A timeout was performed.  A Pfannenstiel skin incision was made with the scalpel. The old scar was removed with a scalpal. The incision was carried down to the fascia using the bovie. The fascia was incised and this was extended laterally with the bovie. The fascia was grasped with Kocher clamps and the underlying rectus muscle was dissected off.  The rectus muscles were  bluntly. The peritoneum was identified and entered bluntly. There was a large anterior uterine adhesion identified connecting the dome of the bladder to the anterior wall of the uterus. It was removed and ligated with a bovie hugging the uterus. The peritoneum was dissected to allow for adequate visualization.    The bladder blade was inserted. The vesicouterine peritoneum was identified. The bladder was back filled with 300 cc of methylene blue and clamped to identified the posterior aspect of the bladder. The bladder intact and not injured.  The lower uterine segment was then incised with a Low Transverse  incision and was extended bluntly. The amniotic sac was ruptured and Clear  fluid noted. The bladder blade was removed and the infant's head was delivered atraumatically using a kiwi vaccum using the usual maneuvers after one gentle pull. The remainder of the infant was delivered, a spontaneous cry was heard, and the infant appeared to be moving all 4 extremities. The cord clamped and cut, and the baby was handed off to the awaiting clinicians and neonatology staff.    The placenta was removed manually. The uterus was then exteriorized and cleared of all clots and debris. The hysterotomy was repaired with #1 Vicryl in a running locked fashion. Multiple figure of eight sutures using 0 vicryl pop offs were placed superior to the hysterotomy where the anterior adhesion was.  One figure of eight was placed along the right angle of the hysterotomy. The bladder was unclamped and drained at this point, still no  injuries identified.  Excellent hemostasis was achieved after placing 3g of donna along the hysterotomy and anterior aspect of the uterus.The ovaries and tubes appeared normal bilaterally. The uterus was then returned to the abdomen. The uterine incision was reinspected and found to be hemostatic. The gutters were inspected and cleared of all debris. The muscle was thoroughly evaluated and found to be hemostatic. The fascia was then reapproximated in an interrupted fashion using suture of #1 Vicryl. The subcutaneous tissue was re-approximated using 3-0 vicryl rapide in a running fashion.The skin was closed with 4-0 Monocryl and dermabond.    The patient tolerated the procedure well. The sponge, instrument, and needle counts were correct and the patient was taken to recovery in stable condition.    Attending Attestation: Dr. Beauchamp was present and scrubbed for the entire procedure.  Minda Marsh MD

## 2019-11-20 NOTE — PROGRESS NOTES
Post-Op Progress Note    Events  Pt seen/examined. Pt denies: HA, CP, SOB, N/V and F/C  Complaining of nothing.    Subjective  Pain: Yes,  controlled  Vaginal Bleeding: minimal  Voiding: arguelles in place  Diet: taking regular diet  Bowel: no flatus  Ambulating: not ambulating    Vitals  Temp:  [36.3 °C (97.3 °F)-37.1 °C (98.8 °F)] 36.3 °C (97.3 °F)  Heart Rate:  [58-78] 61  Resp:  [16-18] 16  BP: ()/(49-68) 103/49    I&O    Intake/Output Summary (Last 24 hours) at 11/20/2019 1451  Last data filed at 11/20/2019 1145  Gross per 24 hour   Intake 1250 ml   Output 900 ml   Net 350 ml     Uop: 200cc in arguelles for 50cc/hr Uop since delivery, adequate    Physical Exam  General: A&Ox3 and NAD   Heart: Regular rate and rhythm  Lungs: Clear to auscultation bilaterally  Abdomen: soft, nondistended, non-tender, no rebound/rigidity/guarding. Fundus firm @ U  Incision: incision C/D/I without erythema or edema  Extremities: symmetric and no edema  Neuro: Grossly intact, no deficits noted.      Assessment/Plan   Problem-based Assessment and Plan    Frances Oconnor is a 44 y.o. POD#0 RCS, retrograde bladder fill     1. Vital Signs: stable  2. Hemodynamics: stable. CBC in the AM. No si/sx acute anemia.   3. Pain: controlled. Continue ERAS protocol.  4. VTE Assessment: Continue SCDs Encouraged ambulation at least 4 times daily.  5. Post-op care: meeting all goals . Adequate UOP. For TOV in am.   6. Hypothyroidism: patient taking own armour-thyroid 90mg BID  7. Asthma: asymptomatic, albuterol PRN     Shanti Singh MD

## 2019-11-20 NOTE — PLAN OF CARE
Problem: Adult Inpatient Plan of Care  Goal: Plan of Care Review  Outcome: Progressing  Goal: Patient-Specific Goal (Individualization)  Outcome: Progressing  Goal: Absence of Hospital-Acquired Illness or Injury  Outcome: Progressing  Goal: Optimal Comfort and Wellbeing  Outcome: Progressing  Goal: Readiness for Transition of Care  Outcome: Progressing  Goal: Rounds/Family Conference  Outcome: Progressing     Problem: Maternal-Fetal Wellbeing  Goal: Optimal Maternal-Fetal Wellbeing  Outcome: Progressing     Problem: Bleeding (Labor)  Goal: Hemostasis  Outcome: Progressing     Problem: Change in Fetal Wellbeing (Labor)  Goal: Stable Fetal Wellbeing  Outcome: Progressing     Problem: Delayed Labor Progression (Labor)  Goal: Effective Progression to Delivery  Outcome: Progressing     Problem: Infection (Labor)  Goal: Absence of Infection Signs/Symptoms  Outcome: Progressing     Problem: Labor Pain (Labor)  Goal: Acceptable Pain Control  Outcome: Progressing     Problem: Uterine Tachysystole (Labor)  Goal: Normal Uterine Contraction Pattern  Outcome: Progressing

## 2019-11-20 NOTE — ANESTHESIA PREPROCEDURE EVALUATION
Anesthesia ROS/MED HX    Anesthesia History - neg  Pulmonary    asthma  Endo/Other   Hypothyroidism (thyroid, oral)      Relevant Problems   No relevant active problems       Physical Exam    Airway   Mallampati: II   TM distance: <3 FB   Neck ROM: full  Cardiovascular - normal   Rhythm: regular   Rate: normalPulmonary - normal   clear to auscultation  Other Findings   Back - neg   landmarks identified        Anesthesia Plan    Plan: spinal   ASA 3  Anesthetic plan and risks discussed with: patient  Comments:    Plan: Frances Oconnor is a 44 y.o. female  at 39w2d with an estimated due date of 2019   SECTION

## 2019-11-21 LAB
ABO + RH BLD: NORMAL
BLD GP AB SCN SERPL QL: POSITIVE
D AG BLD QL: NEGATIVE
ERYTHROCYTE [DISTWIDTH] IN BLOOD BY AUTOMATED COUNT: 16 % (ref 11.7–14.4)
FETAL CELL SCN BLD QL ROSETTE: NEGATIVE
HBV SURFACE AG SER QL: NONREACTIVE
HCT VFR BLDCO AUTO: 28.4 %
HGB BLD-MCNC: 8.6 G/DL (ref 11.8–15.7)
LABORATORY COMMENT REPORT: NORMAL
MCH RBC QN AUTO: 25.2 PG (ref 28–33.2)
MCHC RBC AUTO-ENTMCNC: 30.3 G/DL (ref 32.2–35.5)
MCV RBC AUTO: 83.3 FL (ref 83–98)
PDW BLD AUTO: 12.6 FL (ref 9.4–12.3)
PLATELET # BLD AUTO: 137 K/UL
RBC # BLD AUTO: 3.41 M/UL (ref 3.93–5.22)
WBC # BLD AUTO: 7.59 K/UL

## 2019-11-21 PROCEDURE — 85027 COMPLETE CBC AUTOMATED: CPT | Performed by: STUDENT IN AN ORGANIZED HEALTH CARE EDUCATION/TRAINING PROGRAM

## 2019-11-21 PROCEDURE — 36415 COLL VENOUS BLD VENIPUNCTURE: CPT | Performed by: STUDENT IN AN ORGANIZED HEALTH CARE EDUCATION/TRAINING PROGRAM

## 2019-11-21 PROCEDURE — 63700000 HC SELF-ADMINISTRABLE DRUG: Performed by: STUDENT IN AN ORGANIZED HEALTH CARE EDUCATION/TRAINING PROGRAM

## 2019-11-21 PROCEDURE — 63600000 HC DRUGS/DETAIL CODE: Performed by: STUDENT IN AN ORGANIZED HEALTH CARE EDUCATION/TRAINING PROGRAM

## 2019-11-21 PROCEDURE — 12000000 HC ROOM AND CARE MED/SURG

## 2019-11-21 PROCEDURE — 86901 BLOOD TYPING SEROLOGIC RH(D): CPT

## 2019-11-21 RX ADMIN — ACETAMINOPHEN 975 MG: 325 TABLET, FILM COATED ORAL at 15:18

## 2019-11-21 RX ADMIN — ALUMINUM HYDROXIDE, MAGNESIUM HYDROXIDE, AND SIMETHICONE 30 ML: 200; 200; 20 SUSPENSION ORAL at 20:42

## 2019-11-21 RX ADMIN — KETOROLAC TROMETHAMINE 30 MG: 30 INJECTION, SOLUTION INTRAMUSCULAR; INTRAVENOUS at 18:48

## 2019-11-21 RX ADMIN — ACETAMINOPHEN 975 MG: 325 TABLET, FILM COATED ORAL at 09:42

## 2019-11-21 RX ADMIN — ACETAMINOPHEN 975 MG: 325 TABLET, FILM COATED ORAL at 03:33

## 2019-11-21 RX ADMIN — FERROUS SULFATE TAB 325 MG (65 MG ELEMENTAL FE) 325 MG: 325 (65 FE) TAB at 09:41

## 2019-11-21 RX ADMIN — KETOROLAC TROMETHAMINE 30 MG: 30 INJECTION, SOLUTION INTRAMUSCULAR; INTRAVENOUS at 06:30

## 2019-11-21 RX ADMIN — KETOROLAC TROMETHAMINE 30 MG: 30 INJECTION, SOLUTION INTRAMUSCULAR; INTRAVENOUS at 00:32

## 2019-11-21 RX ADMIN — ACETAMINOPHEN 975 MG: 325 TABLET, FILM COATED ORAL at 21:41

## 2019-11-21 RX ADMIN — SENNOSIDES AND DOCUSATE SODIUM 1 TABLET: 8.6; 5 TABLET ORAL at 20:37

## 2019-11-21 RX ADMIN — SILVER SULFADIAZINE: 10 CREAM TOPICAL at 00:37

## 2019-11-21 RX ADMIN — SENNOSIDES AND DOCUSATE SODIUM 1 TABLET: 8.6; 5 TABLET ORAL at 09:42

## 2019-11-21 RX ADMIN — KETOROLAC TROMETHAMINE 30 MG: 30 INJECTION, SOLUTION INTRAMUSCULAR; INTRAVENOUS at 12:31

## 2019-11-21 NOTE — PROGRESS NOTES
Patient: rFances Oconnor  Procedure(s):   SECTION  Location: [facility] L&D OR    Last vitals:   Vitals:    19 0335   BP: (!) 99/58   Pulse: 73   Resp: 18   Temp: 36.3 °C (97.4 °F)   SpO2: 98%     Level of consciousness: Awake, Alert, Oriented  Post-anesthesia pain: Adequate analgesia  Anesthetic complications: None  Nausea and Vomiting Controlled: Yes  Hydration: Adequate  Cardiovascular Function: Stable  Neuro Exam: WNL  Respiration: WNL  Pain is well controlled after spinal preservative free morphine administration and additional IV/PO meds.  Continue 24 hours observation after preservative free morphine administration.

## 2019-11-21 NOTE — ANESTHESIA POSTPROCEDURE EVALUATION
Patient: Frances Oconnor    Procedure Summary     Date:  19 Room / Location:  [facility] L[facility]D 1 / [facility] L[facility] OR    Anesthesia Start:  852 Anesthesia Stop:      Procedure:   SECTION (N/A ) Diagnosis:       Uterine scar from previous  delivery, with delivery      (Uterine scar from previous  delivery, with delivery [O34.219])    Surgeon:  Pascual Beauchamp MD Responsible Provider:      Anesthesia Type:  spinal ASA Status:  3          Anesthesia Type: spinal  PACU Vitals  2019 1020 - 2019 1120      2019  1042 2019  1055 2019  1100 2019  1106    BP:  --  --  (!) 101/57  --    Pulse:  65  66  64  63    SpO2:  --  97 %  97 %  96 %              2019  1111 2019  1115 2019  1120       BP:  --  (!) 104/57  --     Pulse:  60  60  72     SpO2:  98 %  99 %  97 %             Anesthesia Post Evaluation    Pain management: adequate  Patient location during evaluation: floor  Patient participation: complete - patient participated  Level of consciousness: awake and alert  Cardiovascular status: acceptable  Airway Patency: adequate  Respiratory status: acceptable  Hydration status: acceptable  Anesthetic complications: no

## 2019-11-21 NOTE — LACTATION NOTE
"3rd baby. Mom breast feed other 2 children for 2 yrs. Mom reports average to low milk supply and reports she did pump as needed. Mom has rented a [employer] pump for home. Shown hand expression. +colostrum. Reviewed signs \"baby is getting enough\". Per mom infant BF well. Reviewed BF teaching and BF section of \"New Beginnings\" pt. book. Questions answered. Mom to start pumping after feeds.   "

## 2019-11-21 NOTE — PLAN OF CARE
Problem: Adult Inpatient Plan of Care  Goal: Plan of Care Review  Outcome: Progressing  Flowsheets (Taken 2019 0320)  Progress: improving  Plan of Care Reviewed With: patient  Outcome Summary: Ambulated in room, arguelles draining adequate urine, pain well controlled, bleeding WNL  Goal: Patient-Specific Goal (Individualization)  Outcome: Progressing  Goal: Absence of Hospital-Acquired Illness or Injury  Outcome: Progressing  Goal: Optimal Comfort and Wellbeing  Outcome: Progressing  Goal: Readiness for Transition of Care  Outcome: Progressing  Goal: Rounds/Family Conference  Outcome: Progressing     Problem: Adjustment to Role Transition (Postpartum  Delivery)  Goal: Successful Maternal Role Transition  Outcome: Progressing     Problem: Bleeding (Postpartum  Delivery)  Goal: Hemostasis  Outcome: Progressing     Problem: Infection (Postpartum  Delivery)  Goal: Absence of Infection Signs/Symptoms  Outcome: Progressing     Problem: Pain (Postpartum  Delivery)  Goal: Acceptable Pain Control  Outcome: Progressing     Problem: Postoperative Nausea and Vomiting (Postpartum  Delivery)  Goal: Nausea and Vomiting Relief  Outcome: Progressing     Problem: Postoperative Urinary Retention (Postpartum  Delivery)  Goal: Effective Urinary Elimination  Outcome: Progressing

## 2019-11-21 NOTE — PROGRESS NOTES
Post-Op Progress Note    Events  Pt seen/examined. Pt denies: HA, CP, SOB, N/V and F/C      Subjective  Pain: Yes,  controlled  Vaginal Bleeding: minimal  Voiding: arguelles removed, awaiting voiding trial   Diet: taking regular diet  Bowel: no flatus  Ambulating: not ambulating    Vitals  Temp:  [36.3 °C (97.3 °F)-37.1 °C (98.8 °F)] 36.3 °C (97.4 °F)  Heart Rate:  [58-73] 73  Resp:  [16-18] 18  BP: ()/(49-63) 99/58    I&O    Intake/Output Summary (Last 24 hours) at 11/21/2019 0651  Last data filed at 11/21/2019 0600  Gross per 24 hour   Intake 1250 ml   Output 2950 ml   Net -1700 ml     Uop: 225 cc for the past 3 hours     Physical Exam  General: A&Ox3 and NAD   Heart: Regular rate and rhythm  Lungs: Clear to auscultation bilaterally  Abdomen:soft, mildly distended, non-tender , no rebound/rigidity/guarding. Fundus firm @ U  Incision: incision C/D/I without erythema or edema  Extremities: symmetric and no edema  Neuro: Grossly intact, no deficits noted.    CBC Results       11/21/19 11/20/19 08/13/19                    0512 0604 1748         WBC 7.59 10.29 8.0         RBC 3.41 3.85 3.68         HGB 8.6 9.5 10.8         HCT 28.4 31.5 32.8         MCV 83.3 81.8 89.1         MCH 25.2 24.7 29.3         MCHC 30.3 30.2 32.9          148 145                         Assessment/Plan   Problem-based Assessment and Plan    Frances Oconnor is a 44 y.o. POD#1 RCS, retrograde bladder fill     1. Vital Signs: stable  2. Hemodynamics: stable. Hg 9.5 --> 8.6 No si/sx acute anemia.   3. Pain: controlled. Continue ERAS protocol.  4. VTE Assessment: Continue SCDs Encouraged ambulation at least 4 times daily.  5. Post-op care: meeting all goals . Adequate UOP. Arguelles removed awaiting TOV   6. Hypothyroidism: patient taking own armour-thyroid 90mg BID  7. Asthma: asymptomatic, albuterol PRN     Minda Marsh MD

## 2019-11-22 VITALS
DIASTOLIC BLOOD PRESSURE: 54 MMHG | HEART RATE: 72 BPM | BODY MASS INDEX: 29.92 KG/M2 | HEIGHT: 69 IN | TEMPERATURE: 98.9 F | SYSTOLIC BLOOD PRESSURE: 109 MMHG | RESPIRATION RATE: 18 BRPM | WEIGHT: 202 LBS | OXYGEN SATURATION: 98 %

## 2019-11-22 PROBLEM — O34.219 UTERINE SCAR FROM PREVIOUS CESAREAN DELIVERY, WITH DELIVERY: Status: RESOLVED | Noted: 2019-08-15 | Resolved: 2019-11-22
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
## 2019-11-22 PROBLEM — Z34.02 ENCOUNTER FOR SUPERVISION OF NORMAL FIRST PREGNANCY IN SECOND TRIMESTER: Status: RESOLVED | Noted: 2019-07-19 | Resolved: 2019-11-22

## 2019-11-22 PROBLEM — O42.90 PREMATURE RUPTURE OF MEMBRANES: Status: RESOLVED | Noted: 2019-11-20 | Resolved: 2019-11-22
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
## 2019-11-22 PROCEDURE — 63600000 HC DRUGS/DETAIL CODE: Performed by: STUDENT IN AN ORGANIZED HEALTH CARE EDUCATION/TRAINING PROGRAM

## 2019-11-22 PROCEDURE — 63700000 HC SELF-ADMINISTRABLE DRUG: Performed by: STUDENT IN AN ORGANIZED HEALTH CARE EDUCATION/TRAINING PROGRAM

## 2019-11-22 RX ORDER — FERROUS SULFATE 325(65) MG
325 TABLET ORAL 2 TIMES DAILY WITH MEALS
Qty: 60 TABLET | Refills: 0 | Status: SHIPPED | OUTPATIENT
Start: 2019-11-22 | End: 2019-12-22

## 2019-11-22 RX ORDER — ACETAMINOPHEN 325 MG/1
975 TABLET ORAL
Qty: 60 TABLET | Refills: 0 | Status: SHIPPED | OUTPATIENT
Start: 2019-11-22 | End: 2019-12-22

## 2019-11-22 RX ORDER — IBUPROFEN 600 MG/1
600 TABLET ORAL
Qty: 20 TABLET | Refills: 0 | Status: SHIPPED | OUTPATIENT
Start: 2019-11-22 | End: 2019-11-27

## 2019-11-22 RX ORDER — OXYCODONE HYDROCHLORIDE 5 MG/1
5-10 TABLET ORAL EVERY 4 HOURS PRN
Qty: 15 TABLET | Refills: 0 | Status: SHIPPED | OUTPATIENT
Start: 2019-11-22 | End: 2019-11-27

## 2019-11-22 RX ORDER — IBUPROFEN 600 MG/1
600 TABLET ORAL
Status: DISCONTINUED | OUTPATIENT
Start: 2019-11-22 | End: 2019-11-22 | Stop reason: HOSPADM

## 2019-11-22 RX ADMIN — IBUPROFEN 600 MG: 600 TABLET ORAL at 13:22

## 2019-11-22 RX ADMIN — ACETAMINOPHEN 975 MG: 325 TABLET, FILM COATED ORAL at 10:11

## 2019-11-22 RX ADMIN — ACETAMINOPHEN 975 MG: 325 TABLET, FILM COATED ORAL at 04:05

## 2019-11-22 RX ADMIN — IBUPROFEN 600 MG: 600 TABLET ORAL at 01:31

## 2019-11-22 RX ADMIN — IBUPROFEN 600 MG: 600 TABLET ORAL at 06:53

## 2019-11-22 NOTE — PLAN OF CARE
Problem: Adult Inpatient Plan of Care  Goal: Plan of Care Review  Outcome: Met   Mother would like to be discharged to home today.

## 2019-11-22 NOTE — PLAN OF CARE
Problem: Breastfeeding  Goal: Effective Breastfeeding  Intervention: Promote Effective Breastfeeding  Flowsheets (Taken 11/21/2019 2134)  Parent/Child Attachment Promotion: caring behavior modeled       Mom called LC to room, very anxious about bf/pumping/and infant with mild frenulum per ped.  Questions and concerns answered.  Support given.    Infant latched easily to L breast in football hold.  Infant jaw motion present.    Infant able to maintain latch, strong suck and infant jaw motion present.    Mom aware to offer both breast at each feed, and continue feeding on demand/q3h.    Encouraged to continue pumping after feeds due to hx of low supply.

## 2019-11-22 NOTE — PROGRESS NOTES
Post-Op Progress Note    Events  Pt seen/examined. Pt denies: HA, CP, SOB, N/V and F/C      Subjective  Pain: Yes,  controlled  Vaginal Bleeding: minimal  Voiding: + voiding   Diet: taking regular diet  Bowel:+ flatus + BM   Ambulating: + ambulating     Vitals  Temp:  [36.5 °C (97.7 °F)-36.8 °C (98.2 °F)] 36.6 °C (97.9 °F)  Heart Rate:  [63-71] 66  Resp:  [16-18] 16  BP: (109-117)/(52-77) 113/56    I&O    Intake/Output Summary (Last 24 hours) at 11/22/2019 0615  Last data filed at 11/21/2019 1043  Gross per 24 hour   Intake --   Output 1000 ml   Net -1000 ml     Uop: 225 cc for the past 3 hours     Physical Exam  General: A&Ox3 and NAD   Heart: Regular rate and rhythm  Lungs: Clear to auscultation bilaterally  Abdomen:soft, mildly distended, non-tender , no rebound/rigidity/guarding. Fundus firm @ U  Incision: incision C/D/I without erythema or edema  Extremities: symmetric and no edema  Neuro: Grossly intact, no deficits noted.    CBC Results       11/21/19 11/20/19 08/13/19                    0512 0604 1748         WBC 7.59 10.29 8.0         RBC 3.41 3.85 3.68         HGB 8.6 9.5 10.8         HCT 28.4 31.5 32.8         MCV 83.3 81.8 89.1         MCH 25.2 24.7 29.3         MCHC 30.3 30.2 32.9          148 145                         Assessment/Plan   Problem-based Assessment and Plan    Frances Oconnor is a 44 y.o. POD#1 RCS, retrograde bladder fill     1. Vital Signs: stable  2. Hemodynamics: stable. Hg 9.5 --> 8.6 No si/sx acute anemia.   3. Pain: controlled. Continue ERAS protocol.  4. VTE Assessment: Continue SCDs Encouraged ambulation at least 4 times daily.  5. Post-op care: meeting all goals .   6. Hypothyroidism: patient taking own armour-thyroid 90mg BID  7. Asthma: asymptomatic, albuterol PRN   8. Desires discharge today    Minda Marsh MD

## 2019-11-22 NOTE — LACTATION NOTE
1800:  Mom called LC to room, very anxious about bf/pumping/and infant with mild frenulum per ped.  Questions and concerns answered.  Support given.    LC encouraged Mom to place infant s2s, and call for latch assist when infant began showing cues.    1930: Infant latched easily to L breast in football hold.  Infant jaw motion present.  Techniques reviewed to keep infant actively feeding at breast.      Infant able to maintain latch, strong suck and infant jaw motion present.    Mom aware to offer both breast at each feed, and continue feeding on demand/q3h.    Encouraged to continue pumping after feeds due to hx of low supply.  Will f/u in AM.

## 2019-11-22 NOTE — DISCHARGE SUMMARY
Obstetrical Discharge Form    EDC: Estimated Date of Delivery: 19    Gestational Age:39w2d    Antepartum complications: none    Date of Delivery: 2019 ; Time of Delivery: 9:29am     Delivered By: Pascual Beauchamp     Delivery Type: repeat  section, low transverse incision    Baby: Liveborn female, Apgars 9/9, weight 8 #, 7 oz,     Anesthesia: spinal    Intrapartum complications: None    Laceration: n/a    Episiotomy: none,    Placenta: manual removal    Feeding method: breast    Rh Immune globulin given: yes    Rubella vaccine given: not applicable    Discharge Date: 2019; Discharge Time: 11:00      Plan:    Follow-up appointment with Dr Beauchamp in 4 week.

## 2019-11-26 ENCOUNTER — TELEPHONE (OUTPATIENT)
Dept: OBSTETRICS AND GYNECOLOGY | Facility: CLINIC | Age: 44
End: 2019-11-26

## 2019-11-26 NOTE — TELEPHONE ENCOUNTER
Spoke with patient.   She is POD#6 RCS. She is concerned because she has had no vaginal bleeding in the past 3 days since discharge home. Did have mild spotting in the hospital.    Says after last delivery she had lochia block after she went home. Started to develop severe abdominal pain with no VB, Dr. Beauchamp did US and noted large amt of blood stuck inside uterus and had to do D&C to evacuate it all to relieve her symptoms.    This time she has no disproportionate abdominal pain at all but is concerned she hasnt noticed any bleeding in past 3 days. I explained that this can be normal after a CS but she is very nervous and is requesting to come in for an US just to be sure. Will add to scheduled for tomorrow    Monae Hanna MD

## 2019-11-27 NOTE — TELEPHONE ENCOUNTER
Pc from pt to r/s appt, several appointments offered to pt - pt declined appts today due to her schedule. chad

## 2019-11-29 ENCOUNTER — OFFICE VISIT (OUTPATIENT)
Dept: OBSTETRICS AND GYNECOLOGY | Facility: CLINIC | Age: 44
End: 2019-11-29
Payer: COMMERCIAL

## 2019-11-29 VITALS — BODY MASS INDEX: 28.05 KG/M2 | DIASTOLIC BLOOD PRESSURE: 70 MMHG | SYSTOLIC BLOOD PRESSURE: 116 MMHG | WEIGHT: 190 LBS

## 2019-11-29 PROCEDURE — 99214 OFFICE O/P EST MOD 30 MIN: CPT | Mod: 24 | Performed by: OBSTETRICS & GYNECOLOGY

## 2019-11-29 NOTE — PROGRESS NOTES
OFFICE GYN - PROBLEM VISIT      HPI:  Frances Oconnor is a 43yo PPD#9 RCS who presents today for US given her concern that she has had minimal lochia over the past week. Did have mild spotting in the hospital and for the first few days after arriving home.     Says after last delivery she had lochia block after she went home. Started to develop severe abdominal pain with no VB, says abdomen was exquisitly tender to the slightest touch with worsening pain unrelieved by pain meds. Dr. Beauchamp did US and noted large amt of blood stuck inside uterus and had to do D&C to evacuate it all to relieve her symptoms.     This time she has no disproportionate abdominal pain at all but is very concerned given this past experience, since she hasnt noticed any bleeding in past 3 days. She is requiring nothing for pain, has stopped using even Motrin. Rating abdominal discomfort a 1/10. Has resumed most ADLs without difficulty.  I explained to her on the phone that  this can be normal after a CS to not have bleeding but she is very nervous and is requested to come in for an US just to be sure.     Otherwise feeling very well today. No abdominal pain, fevers, chills. No HA/CP/SOb. No N/V. Tolerating regular diet and continues to pass flatus and void spontaneously    Past Medical History:  has a past medical history of Asthma, BV (bacterial vaginosis), Hypothyroidism, Miscarriage, and Urinary tract infection.     Past Surgical History:  has a past surgical history that includes  section; Dilation and evacuation; and [place] tooth extraction.    Obstetric History:   OB History    Para Term  AB Living   6 3 3 0 3 3   SAB TAB Ectopic Multiple Live Births   3 0 0 0 3      # Outcome Date GA Lbr [place]/2nd Weight Sex Delivery Anes PTL Lv   6 Term 19 39w2d  3850 g (8 lb 7.8 oz) F C-Sect Vac Spinal N NENA   5 Term    3629 g (8 lb) M CS-LTranv  N NENA   4 SAB            3 SAB            2 Term    4536 g  (10 lb) F CS-LTranv   NENA      Complications: Failure to Progress in First Stage   1 SAB 2013                 Medications:   Current Outpatient Medications:   •  acetaminophen (TYLENOL) 325 mg tablet, Take 3 tablets (975 mg total) by mouth every 6 (six) hours., Disp: 60 tablet, Rfl: 0  •  ferrous sulfate 325 mg (65 mg iron) tablet, Take 1 tablet (325 mg total) by mouth 2 (two) times a day with meals., Disp: 60 tablet, Rfl: 0  •  prenatal 25-iron fum-folic-dha (PRENATAL-1) -200 mg-mcg-mg capsule, Prenatal, Disp: , Rfl:   •  THYROID,PORK (ARMOUR THYROID ORAL), Take 90 mg by mouth daily.  , Disp: , Rfl:   •  ibuprofen (MOTRIN) 600 mg tablet, Take 1 tablet (600 mg total) by mouth every 6 (six) hours for 5 days., Disp: 20 tablet, Rfl: 0      Allergies: is allergic to levofloxacin.     Social History: Denies smoking, alcohol, drug use     Family History: Non-contributory    ROS: Negative except as detailed in HPI     Vital Signs for this encounter:   Visit Vitals  /70 (BP Location: Left upper arm, Patient Position: Sitting)   Wt 86.2 kg (190 lb)   LMP  (LMP Unknown)   BMI 28.05 kg/m²       Constitutional: General appearance well-nourished, body habitus normal  Neurologic/Psychiatric: Alert & oriented, mood/affect cooperative and appropriate, no acute distress  Neck: overall appearance normal, normal thyroid, no tenderness/enlargement/masses  Respiratory: normal respiratory effort  Cardiovascular: no LE swelling, well perfused with no edema   Skin: no obvious rashes, lesions, ulcers except as detailed in GYN exam  Breast: Deferred  GI/Abdomen: Soft, nontender, nondistended.  +BS. FF at 3 below umbilicus. No fundal tenderness. Incision c/di  Genitalia:  External: No genital lesions or masses. Normal urethral meatus. Normal appearing perineum and anus.   Speculum:No vaginal bleeding, vaginal discharge, or erythema present. Normal vaginal tissue. Cervix is closed, normal in apperance   Bimanual Exam:  No uterine,  cervical, or adnexal tenderness or masses whatsoever. Uterus is enlarged ~16 weeks size and entirely nontender    Abdominal US: Uterus 12x8cm, thickened EMS with 2cm hematometrium      Impression/Plan:    44 y.o. POD#9 RCs, presents for US given no lochia and hx of lochia block after last delivery    -ultrasound with small amount of hemometria but I explained that this can be normal and we do expect to see some blood within endometrial cavity after  section. I explained that many women have scheduled RCS and we do not automatically dilate cervix to allow for spontaneous drainage in every scenario - the body does resorb this blood over time. What is important is her uterus feels to be appropriately involuting on exam and is entirely nontender. Exam at this time is even better than I would necessarily expect pain-wise on POD9 RCS - She is entirely comfortable even to deep palpation and is requiring no pain meds whatsoever at home, whereas at time of last lochia block requiring D&C she could not even touch her abdomen she was in so much pain from it. I explained that Should she develop fevers, worsening abdominal distention or discomfort, we would of course reevaluate the above recommendations. Patient with poor tolerance of speculum exams but I was able to pass a endometrial biopsy pipette through internal os to demonstrate patency before she requested I abort the procedure (I wanted to dilate slightly to allow drainage tract)    Reassurance and precautions given. She will call back if she develops any fevers, worsening abdominal pain, or with other concerns. .        I spent 30 minutes on this encounter, >50% of that time was spent counseling on hematometra and post partum bleeding/pain precautions and coordinating care    Problem List Items Addressed This Visit     None      Visit Diagnoses     Abnormal uterine bleeding, postpartum    -  Primary          Monae Hanna MD

## 2021-05-24 ENCOUNTER — TELEPHONE (OUTPATIENT)
Dept: OBSTETRICS AND GYNECOLOGY | Facility: CLINIC | Age: 46
End: 2021-05-24

## 2021-05-24 NOTE — TELEPHONE ENCOUNTER
Patient called today stating she has a possible hernia of her belly button. She stated she called Urgent Care to see if she should see them. She was told to call her OBGYN to see if an imaging script could be ordered. Please advise. She said she's probably just going to go to Urgent Care now but wanted to make you aware and if this was something you would be able to order.

## 2021-09-13 ENCOUNTER — OFFICE VISIT (OUTPATIENT)
Dept: SURGERY | Facility: CLINIC | Age: 46
End: 2021-09-13

## 2021-09-13 VITALS
SYSTOLIC BLOOD PRESSURE: 90 MMHG | WEIGHT: 160 LBS | DIASTOLIC BLOOD PRESSURE: 68 MMHG | BODY MASS INDEX: 22.9 KG/M2 | HEIGHT: 70 IN

## 2021-09-13 DIAGNOSIS — K42.9 UMBILICAL HERNIA WITHOUT OBSTRUCTION AND WITHOUT GANGRENE: Primary | ICD-10-CM

## 2021-09-13 PROCEDURE — 99204 OFFICE O/P NEW MOD 45 MIN: CPT | Performed by: SURGERY

## 2021-09-13 PROCEDURE — 3008F BODY MASS INDEX DOCD: CPT | Performed by: SURGERY

## 2021-09-13 NOTE — PROGRESS NOTES
Patient ID: Frances Oconnor                              : 1975  MRN: [id number]                                            Visit Date: 2021  Encounter Provider: Saleem Grande    Chief Complaint: Consult (hernia)    Frances Oconnor is a 46 y.o. female presenting for evaluation of possible umbilical hernia.  She is complaining of occasional discomfort in her umbilical area since 2017 after her second .  She has also noticed a small bulge near the top of her umbilicus.  This discomfort/bulge is occasionally worse with intense exercise.  She also reports 2 episodes of significant pain in the left upper quadrant.  During 1 of these episodes, in 2017, she went to the emergency department.  She says she was told nothing was wrong.  Recently her sister told her that the small bulge looks like it may be a hernia.  She then went to an urgent care center where she was diagnosed with an umbilical hernia and referred for surgical evaluation.  She is also complaining of tightness/pain in her right groin area.  She has not noticed an inguinal bulge.  She denies fever, shortness of breath, nausea vomiting, and diarrhea.    Past Medical History:  has a past medical history of Asthma, BV (bacterial vaginosis), Hypothyroidism, Miscarriage, and Urinary tract infection.  Past Surgical History: has a past surgical history that includes 3  sections (2014, 2017, 2019); Dilation and evacuation; and [place] tooth extraction.  Social History:  reports that she has never smoked. She has never used smokeless tobacco. She reports that she does not drink alcohol and does not use drugs.  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  She does intense exercise regularly.  Family History: family history includes Cancer in her biological father and paternal grandfather; Hypertension in her biological father.   Allergies: is allergic to levofloxacin.    Medications:   Current Outpatient  "Medications:   •  THYROID,PORK (ARMOUR THYROID ORAL), Take 90 mg by mouth daily.  , Disp: , Rfl:   •  ferrous sulfate 325 mg (65 mg iron) tablet, Take 1 tablet (325 mg total) by mouth 2 (two) times a day with meals., Disp: 60 tablet, Rfl: 0  •  ibuprofen (MOTRIN) 600 mg tablet, Take 1 tablet (600 mg total) by mouth every 6 (six) hours for 5 days., Disp: 20 tablet, Rfl: 0  •  prenatal 25-iron fum-folic-dha (PRENATAL-1) -200 mg-mcg-mg capsule, Prenatal, Disp: , Rfl:     Pertinent review of systems:  Positive for softer stools since her  sections.    Visit Vitals  BP 90/68 (BP Location: Right upper arm, Patient Position: Sitting)   Ht 1.778 m (5' 10\")   Wt 72.6 kg (160 lb)   BMI 22.96 kg/m²     General appearance: Very pleasant, no acute distress. Alert, responds appropriately.   HEENT: Normocephalic, without obvious abnormality. Conjunctiva clear. Sclerae anicteric. Isolation mask in place. No cough.   Abdomen: Soft nontender nondistended.  There is a very small umbilical hernia at the top edge of the umbilicus.  The hernia defect seems to be a centimeter or less in diameter.  It is nontender and reducible.  There is no right inguinal hernia.  There is mild tenderness of the right thigh adductor tendon.  No tenderness or palpable abnormality in the left upper quadrant where she reports a history of pain.  Extremities: Warm and well perfused. No edema.  Neurologic:  Grossly normal. Normal mood and affect.    Assessment and Plan:  Very small umbilical hernia.  We discussed the risk and benefits of surgical repair.  She is a candidate for open repair without mesh through a small umbilical incision.  Since it is mostly asymptomatic, she declined to have surgery at this point.  She may continue full activity and exercise.  I encouraged her to contact me if she develops worsening symptoms.  Right inguinal tightness.  This appears to be musculoskeletal.  There is no inguinal hernia.  Two episodes of left upper " quadrant pain in the last few years.  There is no abnormality on physical exam in the left upper quadrant.  She may follow-up as needed.      Saleem Grande MD

## 2024-03-06 ENCOUNTER — APPOINTMENT (OUTPATIENT)
Age: 49
Setting detail: DERMATOLOGY
End: 2024-03-06

## 2024-03-06 DIAGNOSIS — L81.4 OTHER MELANIN HYPERPIGMENTATION: ICD-10-CM

## 2024-03-06 DIAGNOSIS — D18.0 HEMANGIOMA: ICD-10-CM

## 2024-03-06 DIAGNOSIS — L57.8 OTHER SKIN CHANGES DUE TO CHRONIC EXPOSURE TO NONIONIZING RADIATION: ICD-10-CM

## 2024-03-06 DIAGNOSIS — D22 MELANOCYTIC NEVI: ICD-10-CM

## 2024-03-06 DIAGNOSIS — L82.1 OTHER SEBORRHEIC KERATOSIS: ICD-10-CM

## 2024-03-06 PROBLEM — D22.61 MELANOCYTIC NEVI OF RIGHT UPPER LIMB, INCLUDING SHOULDER: Status: ACTIVE | Noted: 2024-03-06

## 2024-03-06 PROBLEM — D18.01 HEMANGIOMA OF SKIN AND SUBCUTANEOUS TISSUE: Status: ACTIVE | Noted: 2024-03-06

## 2024-03-06 PROCEDURE — OTHER SUNSCREEN RECOMMENDATIONS: OTHER

## 2024-03-06 PROCEDURE — 99213 OFFICE O/P EST LOW 20 MIN: CPT

## 2024-03-06 PROCEDURE — OTHER COUNSELING: OTHER

## 2024-03-06 ASSESSMENT — LOCATION DETAILED DESCRIPTION DERM
LOCATION DETAILED: LEFT CENTRAL MALAR CHEEK
LOCATION DETAILED: RIGHT PROXIMAL POSTERIOR UPPER ARM
LOCATION DETAILED: LEFT INFERIOR MEDIAL MIDBACK
LOCATION DETAILED: EPIGASTRIC SKIN
LOCATION DETAILED: RIGHT SUPERIOR UPPER BACK
LOCATION DETAILED: RIGHT MEDIAL UPPER BACK

## 2024-03-06 ASSESSMENT — LOCATION SIMPLE DESCRIPTION DERM
LOCATION SIMPLE: LEFT LOWER BACK
LOCATION SIMPLE: RIGHT UPPER ARM
LOCATION SIMPLE: LEFT CHEEK
LOCATION SIMPLE: ABDOMEN
LOCATION SIMPLE: RIGHT UPPER BACK

## 2024-03-06 ASSESSMENT — LOCATION ZONE DERM
LOCATION ZONE: ARM
LOCATION ZONE: TRUNK
LOCATION ZONE: FACE

## 2024-04-17 ENCOUNTER — HOSPITAL ENCOUNTER (OUTPATIENT)
Dept: HOSPITAL 99 - HWRAD | Age: 49
End: 2024-04-17
Payer: COMMERCIAL

## 2024-04-17 DIAGNOSIS — R93.89: ICD-10-CM

## 2024-04-17 DIAGNOSIS — E07.9: Primary | ICD-10-CM

## 2024-10-02 ENCOUNTER — RX ONLY (RX ONLY)
Age: 49
End: 2024-10-02

## 2024-10-02 RX ORDER — TRETIONIN 0.5 MG/G
CREAM TOPICAL
Qty: 45 | Refills: 5 | Status: ERX | COMMUNITY
Start: 2024-10-02

## (undated) DEVICE — PAD GROUND ELECTROSURGICAL W/CORD

## (undated) DEVICE — DRESSING ABD STER LGE 8X10

## (undated) DEVICE — MANIFOLD FOUR PORT NEPTUNE

## (undated) DEVICE — SUTURE MONOCRYL 4-0 Y426H PS-2 27IN

## (undated) DEVICE — Device

## (undated) DEVICE — BLADE SCALPEL #10

## (undated) DEVICE — DRESSING TELFA 3X8

## (undated) DEVICE — SYRINGE BULB 60CC

## (undated) DEVICE — STERILE BLOOD COLLECTION TUBES

## (undated) DEVICE — APPLICATOR CHLORAPREP 26ML ORANGE TINT

## (undated) DEVICE — SUTURE VICRYL 3-0 RAPIDE 36"

## (undated) DEVICE — STERISTRIP 1/2INX4IN

## (undated) DEVICE — PARTICLES HEMOSTATIC ARISTA 3 GRAM

## (undated) DEVICE — ***USE 56952*** SUTURE VICRYL 1 J371H CTX 36IN VIOLET

## (undated) DEVICE — ***USE 121401***PACK DELIVERY C-SECTION CUSTOM

## (undated) DEVICE — PENCIL ESU HANDSWITCHING W/HOL

## (undated) DEVICE — SYSTEM KIWI OMNICUP VACUUM-ASSISTED DELIVERY

## (undated) DEVICE — SPONGE LAP DISPOSABLE 18X18

## (undated) DEVICE — CONTAINER SPECIMEN STERILE 5OZ

## (undated) DEVICE — ***USE 127961*** SUTURE VICRYL 0 J740D CR CT-1 18IN VIOLET

## (undated) DEVICE — SPONGE CURITY GAUZE 4

## (undated) DEVICE — ADHESIVE, SKIN DERMABOND ADV .7 ML